# Patient Record
Sex: MALE | Employment: FULL TIME | URBAN - METROPOLITAN AREA
[De-identification: names, ages, dates, MRNs, and addresses within clinical notes are randomized per-mention and may not be internally consistent; named-entity substitution may affect disease eponyms.]

---

## 2020-08-06 ENCOUNTER — OP HISTORICAL/CONVERTED ENCOUNTER (OUTPATIENT)
Dept: OTHER | Age: 60
End: 2020-08-06

## 2020-09-01 ENCOUNTER — OP HISTORICAL/CONVERTED ENCOUNTER (OUTPATIENT)
Dept: OTHER | Age: 60
End: 2020-09-01

## 2020-09-16 ENCOUNTER — HOSPITAL ENCOUNTER (OUTPATIENT)
Dept: CARDIAC REHAB | Age: 60
Discharge: HOME | End: 2020-09-16
Payer: COMMERCIAL

## 2020-09-16 VITALS — WEIGHT: 192 LBS

## 2020-09-16 PROCEDURE — 93798 PHYS/QHP OP CAR RHAB W/ECG: CPT

## 2020-09-17 ENCOUNTER — APPOINTMENT (OUTPATIENT)
Dept: CARDIAC REHAB | Age: 60
End: 2020-09-17
Payer: COMMERCIAL

## 2020-09-17 ENCOUNTER — HOSPITAL ENCOUNTER (OUTPATIENT)
Dept: CARDIAC REHAB | Age: 60
Discharge: HOME | End: 2020-09-17
Payer: COMMERCIAL

## 2020-09-17 VITALS — WEIGHT: 192 LBS

## 2020-09-17 PROCEDURE — 93798 PHYS/QHP OP CAR RHAB W/ECG: CPT

## 2020-09-21 ENCOUNTER — HOSPITAL ENCOUNTER (OUTPATIENT)
Dept: CARDIAC REHAB | Age: 60
Discharge: HOME | End: 2020-09-21
Payer: COMMERCIAL

## 2020-09-21 VITALS — WEIGHT: 194 LBS

## 2020-09-21 PROCEDURE — 93797 PHYS/QHP OP CAR RHAB WO ECG: CPT

## 2020-09-21 PROCEDURE — 93798 PHYS/QHP OP CAR RHAB W/ECG: CPT

## 2020-09-23 ENCOUNTER — APPOINTMENT (OUTPATIENT)
Dept: CARDIAC REHAB | Age: 60
End: 2020-09-23
Payer: COMMERCIAL

## 2020-09-24 ENCOUNTER — HOSPITAL ENCOUNTER (OUTPATIENT)
Dept: CARDIAC REHAB | Age: 60
Discharge: HOME | End: 2020-09-24
Payer: COMMERCIAL

## 2020-09-24 VITALS — WEIGHT: 195 LBS

## 2020-09-24 PROCEDURE — 93798 PHYS/QHP OP CAR RHAB W/ECG: CPT

## 2020-09-28 ENCOUNTER — HOSPITAL ENCOUNTER (OUTPATIENT)
Dept: CARDIAC REHAB | Age: 60
Discharge: HOME | End: 2020-09-28
Payer: COMMERCIAL

## 2020-09-28 VITALS — WEIGHT: 195 LBS

## 2020-09-28 PROCEDURE — 93797 PHYS/QHP OP CAR RHAB WO ECG: CPT

## 2020-09-28 PROCEDURE — 93798 PHYS/QHP OP CAR RHAB W/ECG: CPT

## 2020-09-30 ENCOUNTER — HOSPITAL ENCOUNTER (OUTPATIENT)
Dept: CARDIAC REHAB | Age: 60
Discharge: HOME | End: 2020-09-30
Payer: COMMERCIAL

## 2020-09-30 VITALS — WEIGHT: 196 LBS

## 2020-09-30 PROCEDURE — 93798 PHYS/QHP OP CAR RHAB W/ECG: CPT

## 2020-10-01 ENCOUNTER — APPOINTMENT (OUTPATIENT)
Dept: CARDIAC REHAB | Age: 60
End: 2020-10-01
Payer: COMMERCIAL

## 2020-10-01 ENCOUNTER — HOSPITAL ENCOUNTER (OUTPATIENT)
Dept: CARDIAC REHAB | Age: 60
Discharge: HOME | End: 2020-10-01
Payer: COMMERCIAL

## 2020-10-01 ENCOUNTER — OP HISTORICAL/CONVERTED ENCOUNTER (OUTPATIENT)
Dept: OTHER | Age: 60
End: 2020-10-01

## 2020-10-01 VITALS — WEIGHT: 195 LBS

## 2020-10-01 PROCEDURE — 93798 PHYS/QHP OP CAR RHAB W/ECG: CPT

## 2020-10-05 ENCOUNTER — HOSPITAL ENCOUNTER (OUTPATIENT)
Dept: CARDIAC REHAB | Age: 60
Discharge: HOME | End: 2020-10-05
Payer: COMMERCIAL

## 2020-10-05 ENCOUNTER — APPOINTMENT (OUTPATIENT)
Dept: CARDIAC REHAB | Age: 60
End: 2020-10-05
Payer: COMMERCIAL

## 2020-10-05 VITALS — WEIGHT: 195 LBS

## 2020-10-05 PROCEDURE — 93798 PHYS/QHP OP CAR RHAB W/ECG: CPT

## 2020-10-07 ENCOUNTER — HOSPITAL ENCOUNTER (OUTPATIENT)
Dept: CARDIAC REHAB | Age: 60
Discharge: HOME | End: 2020-10-07
Payer: COMMERCIAL

## 2020-10-07 VITALS — WEIGHT: 197 LBS

## 2020-10-07 PROCEDURE — 93798 PHYS/QHP OP CAR RHAB W/ECG: CPT

## 2020-10-08 ENCOUNTER — APPOINTMENT (OUTPATIENT)
Dept: CARDIAC REHAB | Age: 60
End: 2020-10-08
Payer: COMMERCIAL

## 2020-10-08 ENCOUNTER — HOSPITAL ENCOUNTER (OUTPATIENT)
Dept: CARDIAC REHAB | Age: 60
Discharge: HOME | End: 2020-10-08
Payer: COMMERCIAL

## 2020-10-08 VITALS — WEIGHT: 196 LBS

## 2020-10-08 PROCEDURE — 93798 PHYS/QHP OP CAR RHAB W/ECG: CPT

## 2020-10-12 ENCOUNTER — HOSPITAL ENCOUNTER (OUTPATIENT)
Dept: CARDIAC REHAB | Age: 60
Discharge: HOME | End: 2020-10-12
Payer: COMMERCIAL

## 2020-10-12 VITALS — WEIGHT: 196 LBS

## 2020-10-12 PROCEDURE — 93798 PHYS/QHP OP CAR RHAB W/ECG: CPT

## 2020-10-14 ENCOUNTER — HOSPITAL ENCOUNTER (OUTPATIENT)
Dept: CARDIAC REHAB | Age: 60
Discharge: HOME | End: 2020-10-14
Payer: COMMERCIAL

## 2020-10-14 VITALS — WEIGHT: 197 LBS

## 2020-10-14 PROCEDURE — 93798 PHYS/QHP OP CAR RHAB W/ECG: CPT

## 2020-10-15 ENCOUNTER — HOSPITAL ENCOUNTER (OUTPATIENT)
Dept: CARDIAC REHAB | Age: 60
Discharge: HOME | End: 2020-10-15
Payer: COMMERCIAL

## 2020-10-15 VITALS — WEIGHT: 196 LBS

## 2020-10-15 PROCEDURE — 93797 PHYS/QHP OP CAR RHAB WO ECG: CPT

## 2020-10-15 PROCEDURE — 93798 PHYS/QHP OP CAR RHAB W/ECG: CPT

## 2020-10-19 ENCOUNTER — APPOINTMENT (OUTPATIENT)
Dept: CARDIAC REHAB | Age: 60
End: 2020-10-19
Payer: COMMERCIAL

## 2020-10-21 ENCOUNTER — HOSPITAL ENCOUNTER (OUTPATIENT)
Dept: CARDIAC REHAB | Age: 60
Discharge: HOME | End: 2020-10-21
Payer: COMMERCIAL

## 2020-10-21 VITALS — WEIGHT: 199 LBS

## 2020-10-21 PROCEDURE — 93798 PHYS/QHP OP CAR RHAB W/ECG: CPT

## 2020-10-22 ENCOUNTER — HOSPITAL ENCOUNTER (OUTPATIENT)
Dept: CARDIAC REHAB | Age: 60
Discharge: HOME | End: 2020-10-22
Payer: COMMERCIAL

## 2020-10-22 VITALS — WEIGHT: 197 LBS

## 2020-10-22 PROCEDURE — 93798 PHYS/QHP OP CAR RHAB W/ECG: CPT

## 2021-07-17 ENCOUNTER — APPOINTMENT (OUTPATIENT)
Dept: CT IMAGING | Age: 61
End: 2021-07-17
Attending: EMERGENCY MEDICINE
Payer: COMMERCIAL

## 2021-07-17 ENCOUNTER — HOSPITAL ENCOUNTER (EMERGENCY)
Age: 61
Discharge: ANOTHER HOSPITAL | End: 2021-07-17
Attending: EMERGENCY MEDICINE | Admitting: SURGERY
Payer: COMMERCIAL

## 2021-07-17 ENCOUNTER — APPOINTMENT (OUTPATIENT)
Dept: GENERAL RADIOLOGY | Age: 61
End: 2021-07-17
Attending: EMERGENCY MEDICINE
Payer: COMMERCIAL

## 2021-07-17 VITALS
HEIGHT: 70 IN | HEART RATE: 67 BPM | BODY MASS INDEX: 28.49 KG/M2 | TEMPERATURE: 98.3 F | SYSTOLIC BLOOD PRESSURE: 156 MMHG | RESPIRATION RATE: 22 BRPM | OXYGEN SATURATION: 98 % | DIASTOLIC BLOOD PRESSURE: 76 MMHG | WEIGHT: 199 LBS

## 2021-07-17 DIAGNOSIS — S22.41XA MULTIPLE FRACTURES OF RIBS, RIGHT SIDE, INITIAL ENCOUNTER FOR CLOSED FRACTURE: Primary | ICD-10-CM

## 2021-07-17 DIAGNOSIS — J94.2 HEMOPNEUMOTHORAX ON RIGHT: ICD-10-CM

## 2021-07-17 DIAGNOSIS — I71.019 DISSECTION OF THORACIC AORTA: ICD-10-CM

## 2021-07-17 PROBLEM — S22.39XA RIB FRACTURE: Status: ACTIVE | Noted: 2021-07-17

## 2021-07-17 LAB
ALBUMIN SERPL-MCNC: 3.8 G/DL (ref 3.5–5)
ALBUMIN/GLOB SERPL: 1 {RATIO} (ref 1.1–2.2)
ALP SERPL-CCNC: 53 U/L (ref 45–117)
ALT SERPL-CCNC: 33 U/L (ref 12–78)
ANION GAP SERPL CALC-SCNC: 5 MMOL/L (ref 5–15)
AST SERPL W P-5'-P-CCNC: 40 U/L (ref 15–37)
BILIRUB SERPL-MCNC: 0.6 MG/DL (ref 0.2–1)
BUN SERPL-MCNC: 16 MG/DL (ref 6–20)
BUN/CREAT SERPL: 12 (ref 12–20)
CA-I BLD-MCNC: 8.6 MG/DL (ref 8.5–10.1)
CHLORIDE SERPL-SCNC: 108 MMOL/L (ref 97–108)
CO2 SERPL-SCNC: 26 MMOL/L (ref 21–32)
CREAT SERPL-MCNC: 1.37 MG/DL (ref 0.7–1.3)
ERYTHROCYTE [DISTWIDTH] IN BLOOD BY AUTOMATED COUNT: 13.5 % (ref 11.5–14.5)
GLOBULIN SER CALC-MCNC: 3.9 G/DL (ref 2–4)
GLUCOSE SERPL-MCNC: 160 MG/DL (ref 65–100)
HCT VFR BLD AUTO: 48.6 % (ref 36.6–50.3)
HGB BLD-MCNC: 15.9 G/DL (ref 12.1–17)
INR PPP: 1.5 (ref 0.9–1.1)
LACTATE SERPL-SCNC: 1.9 MMOL/L (ref 0.4–2)
LIPASE SERPL-CCNC: 144 U/L (ref 73–393)
MCH RBC QN AUTO: 29.2 PG (ref 26–34)
MCHC RBC AUTO-ENTMCNC: 32.7 G/DL (ref 30–36.5)
MCV RBC AUTO: 89.2 FL (ref 80–99)
NRBC # BLD: 0 K/UL (ref 0–0.01)
NRBC BLD-RTO: 0 PER 100 WBC
PLATELET # BLD AUTO: 258 K/UL (ref 150–400)
PMV BLD AUTO: 10.4 FL (ref 8.9–12.9)
POTASSIUM SERPL-SCNC: 4 MMOL/L (ref 3.5–5.1)
PROT SERPL-MCNC: 7.7 G/DL (ref 6.4–8.2)
PROTHROMBIN TIME: 17.5 SEC (ref 11.9–14.7)
RBC # BLD AUTO: 5.45 M/UL (ref 4.1–5.7)
SODIUM SERPL-SCNC: 139 MMOL/L (ref 136–145)
TROPONIN I SERPL-MCNC: <0.05 NG/ML
WBC # BLD AUTO: 5.2 K/UL (ref 4.1–11.1)

## 2021-07-17 PROCEDURE — 80053 COMPREHEN METABOLIC PANEL: CPT

## 2021-07-17 PROCEDURE — 96374 THER/PROPH/DIAG INJ IV PUSH: CPT

## 2021-07-17 PROCEDURE — 74177 CT ABD & PELVIS W/CONTRAST: CPT

## 2021-07-17 PROCEDURE — 85027 COMPLETE CBC AUTOMATED: CPT

## 2021-07-17 PROCEDURE — 83690 ASSAY OF LIPASE: CPT

## 2021-07-17 PROCEDURE — 85610 PROTHROMBIN TIME: CPT

## 2021-07-17 PROCEDURE — 36415 COLL VENOUS BLD VENIPUNCTURE: CPT

## 2021-07-17 PROCEDURE — 99285 EMERGENCY DEPT VISIT HI MDM: CPT

## 2021-07-17 PROCEDURE — 84484 ASSAY OF TROPONIN QUANT: CPT

## 2021-07-17 PROCEDURE — 93005 ELECTROCARDIOGRAM TRACING: CPT

## 2021-07-17 PROCEDURE — 75810000467 HC TRAUMA RESPONSE LVL III PARITIAL ACTIVATION

## 2021-07-17 PROCEDURE — 71045 X-RAY EXAM CHEST 1 VIEW: CPT

## 2021-07-17 PROCEDURE — 71275 CT ANGIOGRAPHY CHEST: CPT

## 2021-07-17 PROCEDURE — 70450 CT HEAD/BRAIN W/O DYE: CPT

## 2021-07-17 PROCEDURE — 74011000636 HC RX REV CODE- 636: Performed by: EMERGENCY MEDICINE

## 2021-07-17 PROCEDURE — 74011250636 HC RX REV CODE- 250/636: Performed by: EMERGENCY MEDICINE

## 2021-07-17 PROCEDURE — 96375 TX/PRO/DX INJ NEW DRUG ADDON: CPT

## 2021-07-17 PROCEDURE — 83605 ASSAY OF LACTIC ACID: CPT

## 2021-07-17 RX ORDER — MORPHINE SULFATE 4 MG/ML
4 INJECTION INTRAVENOUS ONCE
Status: COMPLETED | OUTPATIENT
Start: 2021-07-17 | End: 2021-07-17

## 2021-07-17 RX ORDER — ONDANSETRON 2 MG/ML
4 INJECTION INTRAMUSCULAR; INTRAVENOUS
Status: COMPLETED | OUTPATIENT
Start: 2021-07-17 | End: 2021-07-17

## 2021-07-17 RX ORDER — OXYCODONE HYDROCHLORIDE 5 MG/1
5 TABLET ORAL
Status: DISCONTINUED | OUTPATIENT
Start: 2021-07-17 | End: 2021-07-17 | Stop reason: HOSPADM

## 2021-07-17 RX ORDER — LIDOCAINE 4 G/100G
1 PATCH TOPICAL EVERY 24 HOURS
Status: DISCONTINUED | OUTPATIENT
Start: 2021-07-17 | End: 2021-07-17 | Stop reason: HOSPADM

## 2021-07-17 RX ORDER — ACETAMINOPHEN 325 MG/1
650 TABLET ORAL 4 TIMES DAILY
Status: DISCONTINUED | OUTPATIENT
Start: 2021-07-17 | End: 2021-07-17 | Stop reason: HOSPADM

## 2021-07-17 RX ADMIN — SODIUM CHLORIDE 1000 ML: 9 INJECTION, SOLUTION INTRAVENOUS at 12:53

## 2021-07-17 RX ADMIN — MORPHINE SULFATE 4 MG: 4 INJECTION, SOLUTION INTRAMUSCULAR; INTRAVENOUS at 12:54

## 2021-07-17 RX ADMIN — ONDANSETRON 4 MG: 2 INJECTION INTRAMUSCULAR; INTRAVENOUS at 12:54

## 2021-07-17 RX ADMIN — IOPAMIDOL 100 ML: 755 INJECTION, SOLUTION INTRAVENOUS at 12:02

## 2021-07-17 NOTE — PROGRESS NOTES
Visit attempted for patient in the ED for [facility]. Staff were providing care for the patient during the visit. There were no family members. Provided silent support and prayer. Chaplains will follow up if further referrals are requested. Chaplain Dani Gama M.Div.    can be reached by calling the  at [facility]  [phone]

## 2021-07-17 NOTE — ED PROVIDER NOTES
HPI   Chief Complaint   Patient presents with   [facility] hx open heart surgery for thoracic aortic dissection and valve repair on warfarin presents by EMS as beta trauma. 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 Hit left forehead and right chest, right upper quadrant abdomen. Mild forehead pain, moderate constant dull right side pain over the chest and abdomen. Mildly nauseated. No neck pain or back pain. Was able to walk a couple of steps on scene. No LOC. History reviewed. No pertinent past medical history. Past Surgical History:   Procedure Laterality Date    SD CHEST SURGERY PROCEDURE UNLISTED      thoracic aortic dissection and valve repair at [facility]         History reviewed. No pertinent family history. Social History     Socioeconomic History    Marital status:      Spouse name: Not on file    Number of children: Not on file    Years of education: Not on file    Highest education level: Not on file   Occupational History    Not on file   Tobacco Use    Smoking status: Not on file   Substance and Sexual Activity    Alcohol use: Not Currently    Drug use: Not on file    Sexual activity: Not on file   Other Topics Concern    Not on file   Social History Narrative    Not on file     Social Determinants of Health     Financial Resource Strain:     Difficulty of Paying Living Expenses:    Food Insecurity:     Worried About Running Out of Food in the Last Year:     [address] in the Last Year:    Transportation Needs:     Lack of Transportation (Medical):      Lack of Transportation (Non-Medical):    Physical Activity:     Days of Exercise per Week:     Minutes of Exercise per Session:    Stress:     Feeling of Stress :    Social Connections:     Frequency of Communication with Friends and Family:     Frequency of Social Gatherings with Friends and Family:     Attends Congregational Services:     Active Member of Clubs or Organizations:     Attends Club or Organization Meetings:     Marital Status:    Intimate Partner Violence:     Fear of Current or Ex-Partner:     Emotionally Abused:     Physically Abused:     Sexually Abused: ALLERGIES: Patient has no known allergies. Review of Systems   Cardiovascular: Positive for chest pain. Gastrointestinal: Positive for abdominal pain and nausea. Neurological: Positive for headaches. All other systems reviewed and are negative. Vitals:    07/17/21 1230 07/17/21 1300 07/17/21 1330 07/17/21 1400   BP: (!) 153/88 (!) 149/92 (!) 141/86 (!) 156/76   Pulse: (!) 59 (!) 59 60 67   Resp: 23 21 24 22   Temp:       SpO2: 96% 98% 97% 98%   Weight:       Height:                Physical Exam   Patient Vitals for the past 8 hrs:   Temp Pulse Resp BP SpO2   07/17/21 1400  67 22 (!) 156/76 98 %   07/17/21 1330  60 24 (!) 141/86 97 %   07/17/21 1300  (!) 59 21 (!) 149/92 98 %   07/17/21 1230  (!) 59 23 (!) 153/88 96 %   07/17/21 1200  60 21 (!) 158/85 97 %   07/17/21 1137 98.3 °F (36.8 °C) 60 17 131/84 96 %        Nursing note and vitals reviewed. Airway and Mental Status: Breathing spontaneously, responsive to voice and touch  Breathing: Bilateral breath sounds, equal chest rise and fall. Circulation: No active external hemorrhage. 2+ bilateral femoral pulses, 2+ bilateral radial pulses. Good skin color. General: No external signs of penetrating trauma or external hemorrage. Neuro: Moves all extremities on command  HEENT: No obvious fractures or deformities. Left forehead abrasion. Pupils equal and reactive. Trachea midline. Chest: No visible deformities. Equal chest rise and fall. Right chest abrasion and tenderness. No crepitus or sternal tenderness. Negative pericardial window. Abdomen: Nondistended, soft, RUQ tender with abrasion. FAST negative  Pelvis: No pelvic instability. Back: No cervical, thoracic or lumbar stepoffs or tenderness.  No ecchymosis or signs of penetrating trauma. Rectal/Perineum/: No signs of perineal trauma. Extremity: No lacerations. No obvious fractures or deformities. Compartments soft in bilateral upper and lower extremities. No visible axillary injuries. MDM   Ddx = head injury, right chest injury (rib fx, pulmonary contusion), right abdominal injury (liver/kidney laceration, contusion). Lower suspicion for spinal injury and all extremity injuries. EKG was obtained for trauma. My preliminary interpretation at 1138 showing normal sinus rhythm, rate 61, anterolateral T wave inversions. No previous EKG for comparison. On re-assessment pt having good pain relief from morphine. Workup reveals two rib fx on the right. On my review of the CT images pt does have tiny hemopneumothorax and thoracic aortic dissection. Pt needs admission for the bleeding rib fx, as he is high risk for further bleeding due to being on warfarin. I consulted Dr Rowena Canas surgery who reviewed his CT imagines with me and he recommends transfer to [facility] for the dissection and rib fx, as he is not comfortable admitting with a visible dissecting flap in the aorta. Pt has previous aorta repair at [facility] with Dr. Louis Brown. I called Dr. Marcelle Gaming surgeon at [address] [place] [address] who says the dissection in the transverse and descending thoracic aorta is a normal postop change as only the ascending aorta is repaired for dissection. He says no need for cardiothoracic intervention. I also called Dr. Rene Olivia with [facility] ED who accepted the patient for transfer. ICD-10-CM ICD-9-CM    1. Multiple fractures of ribs, right side, initial encounter for closed fracture  S22.41XA 807.09    2. Hemopneumothorax on right  J94.2 511.89    3.  Dissection of thoracic aorta (HCC)  I71.01 441.01        Labs Reviewed   METABOLIC PANEL, COMPREHENSIVE - Abnormal; Notable for the following components:       Result Value    Glucose 160 (*)     Creatinine 1.37 (*)     GFR est non-AA 53 (*)     AST (SGOT) 40 (*)     A-G Ratio 1.0 (*)     All other components within normal limits   PROTHROMBIN TIME + INR - Abnormal; Notable for the following components:    Prothrombin time 17.5 (*)     INR 1.5 (*)     All other components within normal limits   CBC W/O DIFF   LIPASE   LACTIC ACID   TROPONIN I   URINALYSIS W/MICROSCOPIC     CT HEAD WO CONT   Final Result   No acute intracranial abnormality. CTA CHEST W OR W WO CONT   Final Result   Acute right rib fractures as above. Age-indeterminate thoracic aortic dissection as above. CT ABD PELV W CONT   Final Result   Small anterior right basilar pneumothorax associated with previously   described right rib fractures. Chronic abdominal and pelvic findings as above. XR CHEST PORT   Final Result   Findings/IMPRESSION:      The cardiac silhouette is enlarged although this is at least in part due to   accentuation by portable AP technique. Post median sternotomy changes. Lung volumes are maintained. No focal consolidation, large pleural effusion, or   discernible pneumothorax. Displaced fractures of the right fifth and sixth ribs and possible fractures of   the right seventh and eighth ribs. Medications   lidocaine 4 % patch 1 Patch (has no administration in time range)   oxyCODONE IR (ROXICODONE) tablet 5 mg (has no administration in time range)   acetaminophen (TYLENOL) tablet 650 mg (has no administration in time range)   sodium chloride 0.9 % bolus infusion 1,000 mL (1,000 mL IntraVENous New Bag 7/17/21 1253)   morphine injection 4 mg (4 mg IntraVENous Given 7/17/21 1254)   ondansetron (ZOFRAN) injection 4 mg (4 mg IntraVENous Given 7/17/21 1254)   iopamidoL (ISOVUE-370) 76 % injection 100 mL (100 mL IntraVENous Given 7/17/21 1202)     Aubree MEDINA MD, am  the first and primary ED provider for this patient. Critical Care  Performed by: Milad Whitney MD  Authorized by:  Milad Whitney MD     Critical care provider statement:     Critical care time (minutes):  45    Critical care time was exclusive of:  Separately billable procedures and treating other patients and teaching time    Critical care was necessary to treat or prevent imminent or life-threatening deterioration of the following conditions:  Trauma    Critical care was time spent personally by me on the following activities:  Blood draw for specimens, development of treatment plan with patient or surrogate, discussions with consultants, evaluation of patient's response to treatment, examination of patient, obtaining history from patient or surrogate, ordering and performing treatments and interventions, ordering and review of laboratory studies, ordering and review of radiographic studies, pulse oximetry and re-evaluation of patient's condition

## 2021-07-17 NOTE — ED NOTES
Report called to Darnell Hernandez RN at [facility]; pt left via [facility]; all personal belongings taken by wife

## 2021-07-17 NOTE — ED TRIAGE NOTES
FALL FROM TREE APPROX 20 FEET, ONTO RIGHT SIDE, NO HEAD OR NECK INJURY, NO LOC,  PT NOT IN C COLLAR ON ER ARRIVAL BUT REFUSED C COLLAR AND STILL REFUSING, PT REQUESTED TO WALK IN, GCS 15,  ANO X 4

## 2021-07-17 NOTE — Clinical Note
Status[de-identified] INPATIENT [101]   Type of Bed: Surgical [18]   Inpatient Hospitalization Certified Necessary for the Following Reasons: 3.  Patient receiving treatment that can only be provided in an inpatient setting (further clarification in H&P documentation)   Admitting Diagnosis: Rib fracture [186780]   Admitting Physician: Masood Acosta [[id number]]   Attending Physician: Masood Acosta [[id number]]   Estimated Length of Stay: 2 Midnights   Discharge Plan[de-identified] Home with Office Follow-up

## 2021-07-19 LAB
ATRIAL RATE: 61 BPM
CALCULATED P AXIS, ECG09: 51 DEGREES
CALCULATED R AXIS, ECG10: 53 DEGREES
CALCULATED T AXIS, ECG11: 94 DEGREES
DIAGNOSIS, 93000: NORMAL
P-R INTERVAL, ECG05: 174 MS
Q-T INTERVAL, ECG07: 426 MS
QRS DURATION, ECG06: 98 MS
QTC CALCULATION (BEZET), ECG08: 428 MS
VENTRICULAR RATE, ECG03: 61 BPM

## 2022-03-19 PROBLEM — S22.39XA RIB FRACTURE: Status: ACTIVE | Noted: 2021-07-17

## 2024-07-03 ENCOUNTER — APPOINTMENT (OUTPATIENT)
Facility: HOSPITAL | Age: 64
End: 2024-07-03
Payer: COMMERCIAL

## 2024-07-03 ENCOUNTER — HOSPITAL ENCOUNTER (EMERGENCY)
Facility: HOSPITAL | Age: 64
Discharge: HOME | End: 2024-07-03
Attending: EMERGENCY MEDICINE
Payer: COMMERCIAL

## 2024-07-03 VITALS
TEMPERATURE: 97.9 F | DIASTOLIC BLOOD PRESSURE: 96 MMHG | BODY MASS INDEX: 27.63 KG/M2 | OXYGEN SATURATION: 98 % | HEART RATE: 60 BPM | WEIGHT: 193 LBS | SYSTOLIC BLOOD PRESSURE: 142 MMHG | RESPIRATION RATE: 18 BRPM | HEIGHT: 70 IN

## 2024-07-03 DIAGNOSIS — R41.0 TRANSIENT CONFUSION: Primary | ICD-10-CM

## 2024-07-03 LAB
ALBUMIN SERPL-MCNC: 3.5 G/DL (ref 3.5–5)
ALBUMIN/GLOB SERPL: 1 (ref 1.1–2.2)
ALP SERPL-CCNC: 61 U/L (ref 45–117)
ALT SERPL-CCNC: 40 U/L (ref 12–78)
ANION GAP SERPL CALC-SCNC: 7 MMOL/L (ref 5–15)
AST SERPL W P-5'-P-CCNC: 38 U/L (ref 15–37)
BASOPHILS # BLD: 0 K/UL (ref 0–0.1)
BASOPHILS NFR BLD: 0 % (ref 0–1)
BILIRUB SERPL-MCNC: 0.5 MG/DL (ref 0.2–1)
BUN SERPL-MCNC: 10 MG/DL (ref 6–20)
BUN/CREAT SERPL: 9 (ref 12–20)
CA-I BLD-MCNC: 8.5 MG/DL (ref 8.5–10.1)
CHLORIDE SERPL-SCNC: 103 MMOL/L (ref 97–108)
CO2 SERPL-SCNC: 29 MMOL/L (ref 21–32)
CREAT SERPL-MCNC: 1.1 MG/DL (ref 0.7–1.3)
DIFFERENTIAL METHOD BLD: ABNORMAL
EOSINOPHIL # BLD: 0.2 K/UL (ref 0–0.4)
EOSINOPHIL NFR BLD: 3 % (ref 0–7)
ERYTHROCYTE [DISTWIDTH] IN BLOOD BY AUTOMATED COUNT: 13.8 % (ref 11.5–14.5)
GLOBULIN SER CALC-MCNC: 3.6 G/DL (ref 2–4)
GLUCOSE SERPL-MCNC: 99 MG/DL (ref 65–100)
HCT VFR BLD AUTO: 44.8 % (ref 36.6–50.3)
HGB BLD-MCNC: 14.9 G/DL (ref 12.1–17)
IMM GRANULOCYTES # BLD AUTO: 0 K/UL (ref 0–0.04)
IMM GRANULOCYTES NFR BLD AUTO: 0 % (ref 0–0.5)
INR PPP: 2.6 (ref 0.9–1.1)
LYMPHOCYTES # BLD: 2.4 K/UL (ref 0.8–3.5)
LYMPHOCYTES NFR BLD: 51 % (ref 12–49)
MCH RBC QN AUTO: 29.7 PG (ref 26–34)
MCHC RBC AUTO-ENTMCNC: 33.3 G/DL (ref 30–36.5)
MCV RBC AUTO: 89.2 FL (ref 80–99)
MONOCYTES # BLD: 0.8 K/UL (ref 0–1)
MONOCYTES NFR BLD: 15 % (ref 5–13)
NEUTS SEG # BLD: 1.5 K/UL (ref 1.8–8)
NEUTS SEG NFR BLD: 31 % (ref 32–75)
PLATELET # BLD AUTO: 227 K/UL (ref 150–400)
PMV BLD AUTO: 9.7 FL (ref 8.9–12.9)
POTASSIUM SERPL-SCNC: 4.2 MMOL/L (ref 3.5–5.1)
PROT SERPL-MCNC: 7.1 G/DL (ref 6.4–8.2)
PROTHROMBIN TIME: 24.5 SEC (ref 9–11.1)
RBC # BLD AUTO: 5.02 M/UL (ref 4.1–5.7)
SODIUM SERPL-SCNC: 139 MMOL/L (ref 136–145)
WBC # BLD AUTO: 4.9 K/UL (ref 4.1–11.1)

## 2024-07-03 PROCEDURE — 80053 COMPREHEN METABOLIC PANEL: CPT

## 2024-07-03 PROCEDURE — 36415 COLL VENOUS BLD VENIPUNCTURE: CPT

## 2024-07-03 PROCEDURE — 85025 COMPLETE CBC W/AUTO DIFF WBC: CPT

## 2024-07-03 PROCEDURE — 70450 CT HEAD/BRAIN W/O DYE: CPT

## 2024-07-03 PROCEDURE — 99284 EMERGENCY DEPT VISIT MOD MDM: CPT

## 2024-07-03 PROCEDURE — 85610 PROTHROMBIN TIME: CPT

## 2024-07-03 ASSESSMENT — LIFESTYLE VARIABLES
HOW MANY STANDARD DRINKS CONTAINING ALCOHOL DO YOU HAVE ON A TYPICAL DAY: PATIENT DOES NOT DRINK
HOW OFTEN DO YOU HAVE A DRINK CONTAINING ALCOHOL: NEVER

## 2024-07-03 ASSESSMENT — PAIN SCALES - GENERAL
PAINLEVEL_OUTOF10: 0
PAINLEVEL_OUTOF10: 0

## 2024-07-03 ASSESSMENT — PAIN - FUNCTIONAL ASSESSMENT
PAIN_FUNCTIONAL_ASSESSMENT: 0-10
PAIN_FUNCTIONAL_ASSESSMENT: 0-10

## 2024-07-04 NOTE — ED PROVIDER NOTES
[facility] EMERGENCY DEPT  EMERGENCY DEPARTMENT HISTORY AND PHYSICAL EXAM      Date: 7/3/2024  Patient Name: Manuel Combs Jr.  MRN: [id number]  YOB: 1960  Date of evaluation: 7/3/2024  Provider: Nena Gardner MD   Note Started: 10:38 PM EDT 7/3/24    HISTORY OF PRESENT ILLNESS     Chief Complaint   Patient presents with    \"feels off\"       History Provided By: Patient    HPI: Manuel Combs Jr. is a 63-year-old male with history of aortic dissection status post AVR on Coumadin, hypertension presenting with 5 to 10-minute episode where he felt off.  Patient was talking to his parents and he felt like he could not concentrate on the conversation and focus.  No numbness, weakness, vision changes.  patient now back to baseline.  Patient states he checked his INR on Friday and was little bit elevated.    PAST MEDICAL HISTORY   Past Medical History:  Past Medical History:   Diagnosis Date    Hypertension        Past Surgical History:  Past Surgical History:   Procedure Laterality Date    AORTIC VALVE REPLACEMENT      CHEST SURGERY      thoracic aortic dissection and valve repair at [facility]       Family History:  History reviewed. No pertinent family history.    Social History:  Social History     Tobacco Use    Smoking status: Never    Smokeless tobacco: Never   Substance Use Topics    Alcohol use: Not Currently    Drug use: Not Currently       Allergies:  No Known Allergies    PCP: Vladislav Irizarry Jr., APRN - NP    Current Meds:   No current facility-administered medications for this encounter.     No current outpatient medications on file.       Social Determinants of Health:   Social Determinants of Health     Tobacco Use: Low Risk  (7/3/2024)    Patient History     Smoking Tobacco Use: Never     Smokeless Tobacco Use: Never     Passive Exposure: Not on file   Alcohol Use: Not At Risk (7/3/2024)    AUDIT-C     Frequency of Alcohol Consumption: Never     Average Number of Drinks: Patient does not

## 2024-07-04 NOTE — DISCHARGE INSTRUCTIONS
Thank you for choosing our Emergency Department for your care.  It is our privilege to care for you in your time of need.  In the next several days, you may receive a survey via email or mailed to your home about your experience with our team.  We would greatly appreciate you taking a few minutes to complete the survey, as we use this information to learn what we have done well and what we could be doing better. Thank you for trusting us with your care!    Below you will find a list of your tests from today's visit.   Labs  Recent Results (from the past 12 hour(s))   CBC with Auto Differential    Collection Time: 07/03/24 10:30 PM   Result Value Ref Range    WBC 4.9 4.1 - 11.1 K/uL    RBC 5.02 4.10 - 5.70 M/uL    Hemoglobin 14.9 12.1 - 17.0 g/dL    Hematocrit 44.8 36.6 - 50.3 %    MCV 89.2 80.0 - 99.0 FL    MCH 29.7 26.0 - 34.0 PG    MCHC 33.3 30.0 - 36.5 g/dL    RDW 13.8 11.5 - 14.5 %    Platelets 227 150 - 400 K/uL    MPV 9.7 8.9 - 12.9 FL    Neutrophils % 31 (L) 32 - 75 %    Lymphocytes % 51 (H) 12 - 49 %    Monocytes % 15 (H) 5 - 13 %    Eosinophils % 3 0 - 7 %    Basophils % 0 0 - 1 %    Immature Granulocytes % 0 0.0 - 0.5 %    Neutrophils Absolute 1.5 (L) 1.8 - 8.0 K/UL    Lymphocytes Absolute 2.4 0.8 - 3.5 K/UL    Monocytes Absolute 0.8 0.0 - 1.0 K/UL    Eosinophils Absolute 0.2 0.0 - 0.4 K/UL    Basophils Absolute 0.0 0.0 - 0.1 K/UL    Immature Granulocytes Absolute 0.0 0.00 - 0.04 K/UL    Differential Type AUTOMATED     Comprehensive Metabolic Panel    Collection Time: 07/03/24 10:30 PM   Result Value Ref Range    Sodium 139 136 - 145 mmol/L    Potassium 4.2 3.5 - 5.1 mmol/L    Chloride 103 97 - 108 mmol/L    CO2 29 21 - 32 mmol/L    Anion Gap 7 5 - 15 mmol/L    Glucose 99 65 - 100 mg/dL    BUN 10 6 - 20 mg/dL    Creatinine 1.10 0.70 - 1.30 mg/dL    BUN/Creatinine Ratio 9 (L) 12 - 20      Est, Glom Filt Rate 76 >60 ml/min/1.73m2    Calcium 8.5 8.5 - 10.1 mg/dL    Total Bilirubin 0.5 0.2 - 1.0 mg/dL

## 2024-07-04 NOTE — ED TRIAGE NOTES
Pt states around 1845 this evening he noticed that he was feeling off while he was having a conversation with someone. States he was having trouble tracking what they were saying.

## 2024-09-20 ENCOUNTER — APPOINTMENT (OUTPATIENT)
Facility: HOSPITAL | Age: 64
DRG: 872 | End: 2024-09-20
Payer: COMMERCIAL

## 2024-09-20 ENCOUNTER — HOSPITAL ENCOUNTER (EMERGENCY)
Facility: HOSPITAL | Age: 64
Discharge: HOME | DRG: 872 | End: 2024-09-20
Payer: COMMERCIAL

## 2024-09-20 ENCOUNTER — HOSPITAL ENCOUNTER (INPATIENT)
Facility: HOSPITAL | Age: 64
LOS: 5 days | Discharge: HOME HEALTH | DRG: 872 | End: 2024-09-26
Attending: STUDENT IN AN ORGANIZED HEALTH CARE EDUCATION/TRAINING PROGRAM | Admitting: INTERNAL MEDICINE
Payer: COMMERCIAL

## 2024-09-20 VITALS
SYSTOLIC BLOOD PRESSURE: 144 MMHG | WEIGHT: 200 LBS | HEIGHT: 70 IN | DIASTOLIC BLOOD PRESSURE: 80 MMHG | BODY MASS INDEX: 28.63 KG/M2 | OXYGEN SATURATION: 98 % | RESPIRATION RATE: 17 BRPM | HEART RATE: 82 BPM | TEMPERATURE: 98.3 F

## 2024-09-20 DIAGNOSIS — R65.20 SEVERE SEPSIS (HCC): ICD-10-CM

## 2024-09-20 DIAGNOSIS — N30.01 ACUTE CYSTITIS WITH HEMATURIA: Primary | ICD-10-CM

## 2024-09-20 DIAGNOSIS — E87.6 HYPOKALEMIA: ICD-10-CM

## 2024-09-20 DIAGNOSIS — N10 ACUTE PYELONEPHRITIS: Primary | ICD-10-CM

## 2024-09-20 DIAGNOSIS — A41.9 SEVERE SEPSIS (HCC): ICD-10-CM

## 2024-09-20 LAB
APPEARANCE UR: ABNORMAL
BACTERIA URNS QL MICRO: ABNORMAL /HPF
BASOPHILS # BLD: 0 K/UL (ref 0–0.1)
BASOPHILS NFR BLD: 0 % (ref 0–1)
BILIRUB UR QL CFM: NEGATIVE
BILIRUB UR QL: ABNORMAL
BLASTS NFR BLD MANUAL: 1 %
CA-I BLD-MCNC: 1.07 MMOL/L (ref 1.12–1.32)
CHLORIDE BLD-SCNC: 100 MMOL/L (ref 98–107)
COLOR UR: ABNORMAL
CREAT UR-MCNC: 1.67 MG/DL (ref 0.6–1.3)
DIFFERENTIAL METHOD BLD: ABNORMAL
EOSINOPHIL # BLD: 0 K/UL (ref 0–0.4)
EOSINOPHIL NFR BLD: 0 % (ref 0–7)
EPITH CASTS URNS QL MICRO: ABNORMAL /LPF
ERYTHROCYTE [DISTWIDTH] IN BLOOD BY AUTOMATED COUNT: 13.7 % (ref 11.5–14.5)
FLUAV RNA SPEC QL NAA+PROBE: NOT DETECTED
FLUBV RNA SPEC QL NAA+PROBE: NOT DETECTED
GLUCOSE BLD STRIP.AUTO-MCNC: 120 MG/DL (ref 65–100)
GLUCOSE UR STRIP.AUTO-MCNC: NEGATIVE MG/DL
HCT VFR BLD AUTO: 42.4 % (ref 36.6–50.3)
HGB BLD-MCNC: 14 G/DL (ref 12.1–17)
HGB UR QL STRIP: ABNORMAL
IMM GRANULOCYTES # BLD AUTO: 0 K/UL
IMM GRANULOCYTES NFR BLD AUTO: 0 %
INR PPP: 2 (ref 0.9–1.1)
KETONES UR QL STRIP.AUTO: NEGATIVE MG/DL
LACTATE BLD-SCNC: 3.02 MMOL/L (ref 0.4–2)
LEUKOCYTE ESTERASE UR QL STRIP.AUTO: ABNORMAL
LYMPHOCYTES # BLD: 1.3 K/UL (ref 0.8–3.5)
LYMPHOCYTES NFR BLD: 24 % (ref 12–49)
MCH RBC QN AUTO: 28.5 PG (ref 26–34)
MCHC RBC AUTO-ENTMCNC: 33 G/DL (ref 30–36.5)
MCV RBC AUTO: 86.4 FL (ref 80–99)
MONOCYTES # BLD: 0.1 K/UL (ref 0–1)
MONOCYTES NFR BLD: 2 % (ref 5–13)
NEUTS BAND NFR BLD MANUAL: 4 % (ref 0–6)
NEUTS SEG # BLD: 3.9 K/UL (ref 1.8–8)
NEUTS SEG NFR BLD: 69 % (ref 32–75)
NITRITE UR QL STRIP.AUTO: POSITIVE
NRBC # BLD: 0 K/UL (ref 0–0.01)
NRBC BLD-RTO: 0 PER 100 WBC
PERFORMED BY:: ABNORMAL
PH UR STRIP: 6 (ref 5–8)
PLATELET # BLD AUTO: 189 K/UL (ref 150–400)
PLATELET COMMENT: ABNORMAL
PMV BLD AUTO: 9.9 FL (ref 8.9–12.9)
POTASSIUM BLD-SCNC: 3.1 MMOL/L (ref 3.5–5.5)
PROT UR STRIP-MCNC: 100 MG/DL
PROTHROMBIN TIME: 22.9 SEC (ref 11.9–14.6)
RBC # BLD AUTO: 4.91 M/UL (ref 4.1–5.7)
RBC #/AREA URNS HPF: ABNORMAL /HPF (ref 0–5)
RBC MORPH BLD: ABNORMAL
SARS-COV-2 RNA RESP QL NAA+PROBE: NOT DETECTED
SERVICE CMNT-IMP: ABNORMAL
SODIUM BLD-SCNC: 135 MMOL/L (ref 136–145)
SP GR UR REFRACTOMETRY: 1.02 (ref 1–1.03)
SPECIMEN SITE: ABNORMAL
URINE CULTURE IF INDICATED: ABNORMAL
UROBILINOGEN UR QL STRIP.AUTO: 4 EU/DL (ref 0.2–1)
WBC # BLD AUTO: 5.3 K/UL (ref 4.1–11.1)
WBC URNS QL MICRO: >100 /HPF (ref 0–4)

## 2024-09-20 PROCEDURE — 2580000003 HC RX 258: Performed by: STUDENT IN AN ORGANIZED HEALTH CARE EDUCATION/TRAINING PROGRAM

## 2024-09-20 PROCEDURE — 83690 ASSAY OF LIPASE: CPT

## 2024-09-20 PROCEDURE — 83735 ASSAY OF MAGNESIUM: CPT

## 2024-09-20 PROCEDURE — 93005 ELECTROCARDIOGRAM TRACING: CPT | Performed by: STUDENT IN AN ORGANIZED HEALTH CARE EDUCATION/TRAINING PROGRAM

## 2024-09-20 PROCEDURE — 83880 ASSAY OF NATRIURETIC PEPTIDE: CPT

## 2024-09-20 PROCEDURE — 71045 X-RAY EXAM CHEST 1 VIEW: CPT

## 2024-09-20 PROCEDURE — 84484 ASSAY OF TROPONIN QUANT: CPT

## 2024-09-20 PROCEDURE — 87636 SARSCOV2 & INF A&B AMP PRB: CPT

## 2024-09-20 PROCEDURE — 70498 CT ANGIOGRAPHY NECK: CPT

## 2024-09-20 PROCEDURE — 82077 ASSAY SPEC XCP UR&BREATH IA: CPT

## 2024-09-20 PROCEDURE — 85025 COMPLETE CBC W/AUTO DIFF WBC: CPT

## 2024-09-20 PROCEDURE — 71046 X-RAY EXAM CHEST 2 VIEWS: CPT

## 2024-09-20 PROCEDURE — 83605 ASSAY OF LACTIC ACID: CPT

## 2024-09-20 PROCEDURE — 80143 DRUG ASSAY ACETAMINOPHEN: CPT

## 2024-09-20 PROCEDURE — 80053 COMPREHEN METABOLIC PANEL: CPT

## 2024-09-20 PROCEDURE — 87186 SC STD MICRODIL/AGAR DIL: CPT

## 2024-09-20 PROCEDURE — 0042T CT BRAIN PERFUSION: CPT

## 2024-09-20 PROCEDURE — 87086 URINE CULTURE/COLONY COUNT: CPT

## 2024-09-20 PROCEDURE — 70450 CT HEAD/BRAIN W/O DYE: CPT

## 2024-09-20 PROCEDURE — 85610 PROTHROMBIN TIME: CPT

## 2024-09-20 PROCEDURE — 74176 CT ABD & PELVIS W/O CONTRAST: CPT

## 2024-09-20 PROCEDURE — 80179 DRUG ASSAY SALICYLATE: CPT

## 2024-09-20 PROCEDURE — 84145 PROCALCITONIN (PCT): CPT

## 2024-09-20 PROCEDURE — 82140 ASSAY OF AMMONIA: CPT

## 2024-09-20 PROCEDURE — 6360000004 HC RX CONTRAST MEDICATION: Performed by: STUDENT IN AN ORGANIZED HEALTH CARE EDUCATION/TRAINING PROGRAM

## 2024-09-20 PROCEDURE — 84443 ASSAY THYROID STIM HORMONE: CPT

## 2024-09-20 PROCEDURE — 87154 CUL TYP ID BLD PTHGN 6+ TRGT: CPT

## 2024-09-20 PROCEDURE — 82550 ASSAY OF CK (CPK): CPT

## 2024-09-20 PROCEDURE — 87040 BLOOD CULTURE FOR BACTERIA: CPT

## 2024-09-20 PROCEDURE — 87088 URINE BACTERIA CULTURE: CPT

## 2024-09-20 PROCEDURE — 87077 CULTURE AEROBIC IDENTIFY: CPT

## 2024-09-20 PROCEDURE — 99285 EMERGENCY DEPT VISIT HI MDM: CPT

## 2024-09-20 PROCEDURE — 96361 HYDRATE IV INFUSION ADD-ON: CPT

## 2024-09-20 PROCEDURE — 99284 EMERGENCY DEPT VISIT MOD MDM: CPT

## 2024-09-20 PROCEDURE — 81001 URINALYSIS AUTO W/SCOPE: CPT

## 2024-09-20 PROCEDURE — 36415 COLL VENOUS BLD VENIPUNCTURE: CPT

## 2024-09-20 PROCEDURE — 96365 THER/PROPH/DIAG IV INF INIT: CPT

## 2024-09-20 PROCEDURE — 6360000002 HC RX W HCPCS: Performed by: STUDENT IN AN ORGANIZED HEALTH CARE EDUCATION/TRAINING PROGRAM

## 2024-09-20 PROCEDURE — 80047 BASIC METABLC PNL IONIZED CA: CPT

## 2024-09-20 RX ORDER — CIPROFLOXACIN 500 MG/1
500 TABLET, FILM COATED ORAL 2 TIMES DAILY
Qty: 20 TABLET | Refills: 0 | Status: ON HOLD | OUTPATIENT
Start: 2024-09-20 | End: 2024-09-26 | Stop reason: HOSPADM

## 2024-09-20 RX ORDER — 0.9 % SODIUM CHLORIDE 0.9 %
30 INTRAVENOUS SOLUTION INTRAVENOUS ONCE
Status: COMPLETED | OUTPATIENT
Start: 2024-09-20 | End: 2024-09-20

## 2024-09-20 RX ORDER — IOPAMIDOL 755 MG/ML
100 INJECTION, SOLUTION INTRAVASCULAR
Status: COMPLETED | OUTPATIENT
Start: 2024-09-20 | End: 2024-09-20

## 2024-09-20 RX ORDER — AMLODIPINE BESYLATE 10 MG/1
10 TABLET ORAL DAILY
Status: ON HOLD | COMMUNITY
End: 2024-09-26 | Stop reason: HOSPADM

## 2024-09-20 RX ORDER — WARFARIN SODIUM 5 MG/1
5 TABLET ORAL SEE ADMIN INSTRUCTIONS
COMMUNITY

## 2024-09-20 RX ADMIN — IOPAMIDOL 100 ML: 755 INJECTION, SOLUTION INTRAVENOUS at 22:14

## 2024-09-20 RX ADMIN — SODIUM CHLORIDE 2721 ML: 9 INJECTION, SOLUTION INTRAVENOUS at 22:00

## 2024-09-20 RX ADMIN — PIPERACILLIN AND TAZOBACTAM 4500 MG: 4; .5 INJECTION, POWDER, LYOPHILIZED, FOR SOLUTION INTRAVENOUS at 23:41

## 2024-09-20 ASSESSMENT — LIFESTYLE VARIABLES
HOW OFTEN DO YOU HAVE A DRINK CONTAINING ALCOHOL: NEVER
HOW MANY STANDARD DRINKS CONTAINING ALCOHOL DO YOU HAVE ON A TYPICAL DAY: PATIENT DOES NOT DRINK

## 2024-09-20 ASSESSMENT — PAIN SCALES - GENERAL: PAINLEVEL_OUTOF10: 5

## 2024-09-20 ASSESSMENT — PAIN - FUNCTIONAL ASSESSMENT: PAIN_FUNCTIONAL_ASSESSMENT: 0-10

## 2024-09-20 NOTE — DISCHARGE INSTRUCTIONS
Thank you for choosing our Emergency Department for your care.  It is our privilege to care for you in your time of need.  In the next several days, you may receive a survey via email or mailed to your home about your experience with our team.  We would greatly appreciate you taking a few minutes to complete the survey, as we use this information to learn what we have done well and what we could be doing better. Thank you for trusting us with your care!    Below you will find a list of your tests from today's visit.   Labs  Recent Results (from the past 12 hour(s))   Urinalysis with Reflex to Culture    Collection Time: 09/20/24 10:53 AM    Specimen: Urine   Result Value Ref Range    Color, UA Dark Yellow      Appearance Cloudy (A) Clear      Specific Gravity, UA 1.020 1.003 - 1.030      pH, Urine 6.0 5.0 - 8.0      Protein,  (A) Negative mg/dL    Glucose, Ur Negative Negative mg/dL    Ketones, Urine Negative Negative mg/dL    Bilirubin, Urine Small (A) Negative      Blood, Urine Large (A) Negative      Urobilinogen, Urine 4.0 (H) 0.2 - 1.0 EU/dL    Nitrite, Urine Positive (A) Negative      Leukocyte Esterase, Urine Large (A) Negative      Bilirubin Confirmation, UA Negative Negative      WBC, UA >100 (H) 0 - 4 /hpf    RBC, UA 10-20 0 - 5 /hpf    Epithelial Cells, UA Few Few /lpf    BACTERIA, URINE 3+ (A) Negative /hpf    Urine Culture if Indicated Urine Culture Ordered (A) Culture not indicated by UA result     COVID-19 & Influenza Combo    Collection Time: 09/20/24 10:53 AM    Specimen: Nasopharyngeal   Result Value Ref Range    SARS-CoV-2, PCR Not Detected Not Detected      Rapid Influenza A By PCR Not Detected Not Detected      Rapid Influenza B By PCR Not Detected Not Detected         Radiologic Studies  XR CHEST (2 VW)   Final Result   No acute cardiopulmonary disease.         Electronically signed by Jesse Alexander MD

## 2024-09-21 PROBLEM — A41.9 SEVERE SEPSIS (HCC): Status: ACTIVE | Noted: 2024-09-21

## 2024-09-21 PROBLEM — R65.20 SEVERE SEPSIS (HCC): Status: ACTIVE | Noted: 2024-09-21

## 2024-09-21 LAB
ACCESSION NUMBER, LLC1M: ABNORMAL
ACINETOBACTER CALCOAC BAUMANNII COMPLEX BY PCR: NOT DETECTED
ALBUMIN SERPL-MCNC: 2.8 G/DL (ref 3.5–5)
ALBUMIN/GLOB SERPL: 0.8 (ref 1.1–2.2)
ALP SERPL-CCNC: 158 U/L (ref 45–117)
ALT SERPL-CCNC: 29 U/L (ref 12–78)
AMMONIA PLAS-SCNC: 11 UMOL/L
AMPHET UR QL SCN: NEGATIVE
ANION GAP SERPL CALC-SCNC: 12 MMOL/L (ref 2–12)
APAP SERPL-MCNC: <2 UG/ML (ref 10–30)
APPEARANCE UR: ABNORMAL
AST SERPL W P-5'-P-CCNC: 25 U/L (ref 15–37)
BACTERIA URNS QL MICRO: ABNORMAL /HPF
BACTEROIDES FRAGILIS BY PCR: NOT DETECTED
BARBITURATES UR QL SCN: NEGATIVE
BENZODIAZ UR QL: NEGATIVE
BILIRUB SERPL-MCNC: 1.4 MG/DL (ref 0.2–1)
BILIRUB UR QL: NEGATIVE
BIOFIRE TEST COMMENT: ABNORMAL
BLACTX-M ISLT/SPM QL: DETECTED
BLAIMP ISLT/SPM QL: NOT DETECTED
BLAKPC ISLT/SPM QL: NOT DETECTED
BLAOXA-48-LIKE ISLT/SPM QL: NOT DETECTED
BLAVIM ISLT/SPM QL: NOT DETECTED
BNP SERPL-MCNC: 660 PG/ML
BUN SERPL-MCNC: 20 MG/DL (ref 6–20)
BUN/CREAT SERPL: 9 (ref 12–20)
CA-I BLD-MCNC: 7.9 MG/DL (ref 8.5–10.1)
CANDIDA ALBICANS BY PCR: NOT DETECTED
CANDIDA AURIS BY PCR: NOT DETECTED
CANDIDA GLABRATA: NOT DETECTED
CANDIDA KRUSEI BY PCR: NOT DETECTED
CANDIDA PARAPSILOSIS BY PCR: NOT DETECTED
CANDIDA TROPICALIS BY PCR: NOT DETECTED
CANNABINOIDS UR QL SCN: NEGATIVE
CHLORIDE SERPL-SCNC: 104 MMOL/L (ref 97–108)
CK SERPL-CCNC: 198 U/L (ref 39–308)
CO2 SERPL-SCNC: 19 MMOL/L (ref 21–32)
COCAINE UR QL SCN: NEGATIVE
COLISTIN RES MCR-1 ISLT/SPM QL: NOT DETECTED
COLOR UR: ABNORMAL
CREAT SERPL-MCNC: 2.17 MG/DL (ref 0.7–1.3)
CRYPTOCOCCUS NEOFORMANS/GATTII BY PCR: NOT DETECTED
DATE LAST DOSE: NOT DETECTED
DATE LAST DOSE: NOT DETECTED
DOSE AMOUNT: NOT DETECTED UNITS
DOSE AMOUNT: NOT DETECTED UNITS
ENTEROBACTER CLOACAE COMPLEX BY PCR: NOT DETECTED
ENTEROBACTERALES BY PCR: DETECTED
ENTEROCOCCUS FAECALIS BY PCR: NOT DETECTED
ENTEROCOCCUS FAECIUM BY PCR: NOT DETECTED
EPITH CASTS URNS QL MICRO: ABNORMAL /LPF
ESCHERICHIA COLI: DETECTED
ETHANOL SERPL-MCNC: <10 MG/DL (ref 0–0.08)
FLUAV RNA SPEC QL NAA+PROBE: NOT DETECTED
FLUBV RNA SPEC QL NAA+PROBE: NOT DETECTED
GLOBULIN SER CALC-MCNC: 3.3 G/DL (ref 2–4)
GLUCOSE SERPL-MCNC: 114 MG/DL (ref 65–100)
GLUCOSE UR STRIP.AUTO-MCNC: NEGATIVE MG/DL
HAEMOPHILUS INFLUENZAE BY PCR: NOT DETECTED
HGB UR QL STRIP: ABNORMAL
KETONES UR QL STRIP.AUTO: NEGATIVE MG/DL
KLEBSIELLA AEROGENES BY PCR: NOT DETECTED
KLEBSIELLA OXYTOCA BY PCR: NOT DETECTED
KLEBSIELLA PNEUMONIAE GROUP BY PCR: NOT DETECTED
LACTATE BLD-SCNC: 2.63 MMOL/L (ref 0.4–2)
LACTATE BLD-SCNC: 2.98 MMOL/L (ref 0.4–2)
LEUKOCYTE ESTERASE UR QL STRIP.AUTO: ABNORMAL
LIPASE SERPL-CCNC: 32 U/L (ref 13–75)
LISTERIA MONOCYTOGENES BY PCR: NOT DETECTED
Lab: NORMAL
MAGNESIUM SERPL-MCNC: 1.8 MG/DL (ref 1.6–2.4)
METHADONE UR QL: NEGATIVE
MUCOUS THREADS URNS QL MICRO: ABNORMAL /LPF
NEISSERIA MENINGITIDIS BY PCR: NOT DETECTED
NITRITE UR QL STRIP.AUTO: NEGATIVE
OPIATES UR QL: NEGATIVE
PCP UR QL: NEGATIVE
PERFORMED BY:: ABNORMAL
PERFORMED BY:: ABNORMAL
PH UR STRIP: 5 (ref 5–8)
POTASSIUM SERPL-SCNC: 3.1 MMOL/L (ref 3.5–5.1)
PROCALCITONIN SERPL-MCNC: 61 NG/ML
PROT SERPL-MCNC: 6.1 G/DL (ref 6.4–8.2)
PROT UR STRIP-MCNC: 30 MG/DL
PROTEUS BY PCR: NOT DETECTED
PSEUDOMONAS AERUGINOSA, PSAEP: NOT DETECTED
RBC #/AREA URNS HPF: ABNORMAL /HPF (ref 0–5)
RESISTANT GENE NDM BY PCR: NOT DETECTED
RESISTANT GENE TARGETS: ABNORMAL
SALICYLATES SERPL-MCNC: <1.7 MG/DL (ref 2.8–20)
SALMONELLA SPECIES BY PCR: NOT DETECTED
SARS-COV-2 RNA RESP QL NAA+PROBE: NOT DETECTED
SERRATIA MARCESCENS BY PCR: NOT DETECTED
SODIUM SERPL-SCNC: 135 MMOL/L (ref 136–145)
SP GR UR REFRACTOMETRY: >1.03 (ref 1–1.03)
STAPHYLOCOCCUS AUREUS: NOT DETECTED
STAPHYLOCOCCUS EPIDERMIDIS BY PCR: NOT DETECTED
STAPHYLOCOCCUS LUGDUNENSIS BY PCR: NOT DETECTED
STAPHYLOCOCCUS: NOT DETECTED
STENOTROPHOMONAS MALTOPHILIA BY PCR: NOT DETECTED
STREPTOCOCCUS AGALACTIAE (GROUP B): NOT DETECTED
STREPTOCOCCUS PNEUMONIAE , SPNP: NOT DETECTED
STREPTOCOCCUS PYOGENES (GROUP A), SPYOP: NOT DETECTED
STREPTOCOCCUS: NOT DETECTED
TROPONIN I SERPL HS-MCNC: 42 NG/L (ref 0–76)
TROPONIN I SERPL HS-MCNC: 82 NG/L (ref 0–76)
TROPONIN I SERPL HS-MCNC: 87 NG/L (ref 0–76)
TSH SERPL DL<=0.05 MIU/L-ACNC: 2.29 UIU/ML (ref 0.36–3.74)
URINE CULTURE IF INDICATED: ABNORMAL
UROBILINOGEN UR QL STRIP.AUTO: 0.1 EU/DL (ref 0.1–1)
WBC URNS QL MICRO: ABNORMAL /HPF (ref 0–4)

## 2024-09-21 PROCEDURE — 80307 DRUG TEST PRSMV CHEM ANLYZR: CPT

## 2024-09-21 PROCEDURE — 84484 ASSAY OF TROPONIN QUANT: CPT

## 2024-09-21 PROCEDURE — 96365 THER/PROPH/DIAG IV INF INIT: CPT

## 2024-09-21 PROCEDURE — 2580000003 HC RX 258: Performed by: STUDENT IN AN ORGANIZED HEALTH CARE EDUCATION/TRAINING PROGRAM

## 2024-09-21 PROCEDURE — 87086 URINE CULTURE/COLONY COUNT: CPT

## 2024-09-21 PROCEDURE — 81001 URINALYSIS AUTO W/SCOPE: CPT

## 2024-09-21 PROCEDURE — 96366 THER/PROPH/DIAG IV INF ADDON: CPT

## 2024-09-21 PROCEDURE — 6360000002 HC RX W HCPCS: Performed by: STUDENT IN AN ORGANIZED HEALTH CARE EDUCATION/TRAINING PROGRAM

## 2024-09-21 PROCEDURE — 36415 COLL VENOUS BLD VENIPUNCTURE: CPT

## 2024-09-21 PROCEDURE — 83605 ASSAY OF LACTIC ACID: CPT

## 2024-09-21 PROCEDURE — 2580000003 HC RX 258: Performed by: INTERNAL MEDICINE

## 2024-09-21 PROCEDURE — 6370000000 HC RX 637 (ALT 250 FOR IP): Performed by: INTERNAL MEDICINE

## 2024-09-21 PROCEDURE — 96367 TX/PROPH/DG ADDL SEQ IV INF: CPT

## 2024-09-21 PROCEDURE — 1100000000 HC RM PRIVATE

## 2024-09-21 PROCEDURE — 6360000002 HC RX W HCPCS: Performed by: INTERNAL MEDICINE

## 2024-09-21 PROCEDURE — 84154 ASSAY OF PSA FREE: CPT

## 2024-09-21 PROCEDURE — 99222 1ST HOSP IP/OBS MODERATE 55: CPT | Performed by: PSYCHIATRY & NEUROLOGY

## 2024-09-21 PROCEDURE — 84153 ASSAY OF PSA TOTAL: CPT

## 2024-09-21 RX ORDER — WARFARIN SODIUM 5 MG/1
5 TABLET ORAL SEE ADMIN INSTRUCTIONS
Status: DISCONTINUED | OUTPATIENT
Start: 2024-09-21 | End: 2024-09-21

## 2024-09-21 RX ORDER — SODIUM CHLORIDE, SODIUM LACTATE, POTASSIUM CHLORIDE, AND CALCIUM CHLORIDE .6; .31; .03; .02 G/100ML; G/100ML; G/100ML; G/100ML
1000 INJECTION, SOLUTION INTRAVENOUS
Status: COMPLETED | OUTPATIENT
Start: 2024-09-21 | End: 2024-09-21

## 2024-09-21 RX ORDER — POTASSIUM CHLORIDE 1500 MG/1
40 TABLET, EXTENDED RELEASE ORAL ONCE
Status: COMPLETED | OUTPATIENT
Start: 2024-09-21 | End: 2024-09-21

## 2024-09-21 RX ORDER — POTASSIUM CHLORIDE 7.45 MG/ML
10 INJECTION INTRAVENOUS
Status: COMPLETED | OUTPATIENT
Start: 2024-09-21 | End: 2024-09-21

## 2024-09-21 RX ORDER — SODIUM CHLORIDE 9 MG/ML
125 INJECTION, SOLUTION INTRAVENOUS ONCE
Status: COMPLETED | OUTPATIENT
Start: 2024-09-21 | End: 2024-09-21

## 2024-09-21 RX ORDER — SODIUM CHLORIDE 9 MG/ML
INJECTION, SOLUTION INTRAVENOUS CONTINUOUS
Status: DISCONTINUED | OUTPATIENT
Start: 2024-09-21 | End: 2024-09-26 | Stop reason: HOSPADM

## 2024-09-21 RX ORDER — TAMSULOSIN HYDROCHLORIDE 0.4 MG/1
0.4 CAPSULE ORAL DAILY
Status: DISCONTINUED | OUTPATIENT
Start: 2024-09-21 | End: 2024-09-26 | Stop reason: HOSPADM

## 2024-09-21 RX ORDER — SOTALOL HYDROCHLORIDE 80 MG/1
40 TABLET ORAL 2 TIMES DAILY
Status: DISCONTINUED | OUTPATIENT
Start: 2024-09-21 | End: 2024-09-26 | Stop reason: HOSPADM

## 2024-09-21 RX ORDER — WARFARIN SODIUM 5 MG/1
5 TABLET ORAL
Status: COMPLETED | OUTPATIENT
Start: 2024-09-21 | End: 2024-09-21

## 2024-09-21 RX ADMIN — PIPERACILLIN AND TAZOBACTAM 3375 MG: 3; .375 INJECTION, POWDER, LYOPHILIZED, FOR SOLUTION INTRAVENOUS at 10:59

## 2024-09-21 RX ADMIN — SODIUM CHLORIDE: 9 INJECTION, SOLUTION INTRAVENOUS at 10:54

## 2024-09-21 RX ADMIN — POTASSIUM CHLORIDE 40 MEQ: 1500 TABLET, EXTENDED RELEASE ORAL at 10:45

## 2024-09-21 RX ADMIN — SOTALOL HYDROCHLORIDE 40 MG: 80 TABLET ORAL at 20:49

## 2024-09-21 RX ADMIN — WARFARIN SODIUM 5 MG: 5 TABLET ORAL at 20:50

## 2024-09-21 RX ADMIN — SODIUM CHLORIDE: 9 INJECTION, SOLUTION INTRAVENOUS at 18:57

## 2024-09-21 RX ADMIN — VANCOMYCIN HYDROCHLORIDE 2250 MG: 1.25 INJECTION, POWDER, LYOPHILIZED, FOR SOLUTION INTRAVENOUS at 00:46

## 2024-09-21 RX ADMIN — TAMSULOSIN HYDROCHLORIDE 0.4 MG: 0.4 CAPSULE ORAL at 10:51

## 2024-09-21 RX ADMIN — POTASSIUM CHLORIDE 10 MEQ: 7.46 INJECTION, SOLUTION INTRAVENOUS at 02:36

## 2024-09-21 RX ADMIN — POTASSIUM CHLORIDE 10 MEQ: 7.46 INJECTION, SOLUTION INTRAVENOUS at 01:26

## 2024-09-21 RX ADMIN — PIPERACILLIN AND TAZOBACTAM 3375 MG: 3; .375 INJECTION, POWDER, LYOPHILIZED, FOR SOLUTION INTRAVENOUS at 19:00

## 2024-09-21 RX ADMIN — SODIUM CHLORIDE 125 ML/HR: 9 INJECTION, SOLUTION INTRAVENOUS at 05:12

## 2024-09-21 RX ADMIN — SODIUM CHLORIDE, POTASSIUM CHLORIDE, SODIUM LACTATE AND CALCIUM CHLORIDE 1000 ML: 600; 310; 30; 20 INJECTION, SOLUTION INTRAVENOUS at 01:23

## 2024-09-21 RX ADMIN — SOTALOL HYDROCHLORIDE 40 MG: 80 TABLET ORAL at 10:44

## 2024-09-21 ASSESSMENT — PAIN SCALES - GENERAL
PAINLEVEL_OUTOF10: 0
PAINLEVEL_OUTOF10: 0

## 2024-09-21 NOTE — PROGRESS NOTES
Called [facility] and spoke to [facility] for tele-neuro consult general rounding this morning.

## 2024-09-21 NOTE — H&P
Department of Internal Medicine  General Internal Medicine  Attending History and Physical      CHIEF COMPLAINT: Sepsis severe  Severe UTI  Complicated UTI  BPH  History of present prostate biopsy  Atrial fibrillation  Acute kidney injury  Volume depletion  Hypokalemia   reason for Admission:      History Obtained From:  patient, electronic medical record    HISTORY OF PRESENT ILLNESS:      The patient is a 63 y.o. male with significant past medical history of hypertension and atrial fibrillation who presents with weakness lethargy and malaise patient recently had a prostate biopsy in emergency department creatinine was elevated potassium was low and CT of the abdomen and pelvis shows severe cystitis he has a past history of BPH    Past Medical History:        Diagnosis Date    Hypertension      Past Surgical History:        Procedure Laterality Date    AORTIC VALVE REPLACEMENT      CHEST SURGERY      thoracic aortic dissection and valve repair at [facility]     Immunizations:                Influenza:  Not indicated            Pneumococcal Polysaccharide:  Not indicated    Medications Prior to Admission:    Medications Prior to Admission: amLODIPine (NORVASC) 10 MG tablet, Take 1 tablet by mouth daily  Sotalol HCl AF 80 MG TABS, Take 40 mg by mouth 2 times daily  warfarin (COUMADIN) 5 MG tablet, Take 1 tablet by mouth See Admin Instructions 5 mg fri sat sun mond  7.0 mg tues Wednesday thursd  ciprofloxacin (CIPRO) 500 MG tablet, Take 1 tablet by mouth 2 times daily for 10 days    Allergies:  Lidocaine    Social History:   TOBACCO:   reports that he has never smoked. He has never used smokeless tobacco.  ETOH:   reports that he does not currently use alcohol.    Family History:   No family history on file.  REVIEW OF SYSTEMS:  CONSTITUTIONAL:  negative except for  fatigue and malaise  EYES:  negative  HEENT:  negative  RESPIRATORY:  negative  CARDIOVASCULAR:  negative  GASTROINTESTINAL:  negative  GENITOURINARY:

## 2024-09-21 NOTE — ED PROVIDER NOTES
[facility] EMERGENCY DEPT  EMERGENCY DEPARTMENT HISTORY AND PHYSICAL EXAM      Date: 9/20/2024  Patient Name: Manuel Combs Jr.  MRN: [id number]  Birthdate 1960  Date of evaluation: 9/20/2024  Provider: Viri Bond MD   Note Started: 7:39 PM EDT 9/21/24    HISTORY OF PRESENT ILLNESS     Chief Complaint   Patient presents with    Generalized Body Aches       History Provided By: Patient    HPI: Manuel Combs Jr. is a 63 y.o. male with pmhx as reviewed below who presents with low grade fever and myalgias. Pt reports he had a fever to 100.1 last night. This morning reports his neck felt achy, denies any neck stiffness, headaches, cough, congestion, sore throat. Does report that he had a prostate biopsy 1 wk ago. Has had some hematuria but was told this would be normal.     PAST MEDICAL HISTORY   Past Medical History:  Past Medical History:   Diagnosis Date    Hypertension        Past Surgical History:  Past Surgical History:   Procedure Laterality Date    AORTIC VALVE REPLACEMENT      CHEST SURGERY      thoracic aortic dissection and valve repair at [facility]       Family History:  No family history on file.    Social History:  Social History     Tobacco Use    Smoking status: Never    Smokeless tobacco: Never   Substance Use Topics    Alcohol use: Not Currently    Drug use: Not Currently       Allergies:  Allergies   Allergen Reactions    Lidocaine        PCP: Vladislav Irizarry Jr., APRN - NP    Current Meds:   No current facility-administered medications for this encounter.     No current outpatient medications on file.     Facility-Administered Medications Ordered in Other Encounters   Medication Dose Route Frequency Provider Last Rate Last Admin    sotalol (BETAPACE) tablet 40 mg  40 mg Oral BID Eliu Medeiros MD   40 mg at 09/21/24 1044    piperacillin-tazobactam (ZOSYN) 3,375 mg in sodium chloride 0.9 % 50 mL IVPB (mini-bag)  3,375 mg IntraVENous Q8H Eliu Medeiros MD 12.5 mL/hr at 09/21/24       ED Course User Index  [DM] Viri Bond MD       SEPSIS Reassessment: Sepsis reassessment not applicable    Clinical Management Tools:  Not Applicable    Patient was given the following medications:  Medications - No data to display    CONSULTS: See ED Course/MDM for further details.  None     Social Determinants affecting Diagnosis/Treatment: None    Smoking Cessation: Not Applicable    PROCEDURES   Unless otherwise noted above, none  Procedures      CRITICAL CARE TIME   Patient does not meet Critical Care Time, 0 minutes    ED IMPRESSION     1. Acute cystitis with hematuria          DISPOSITION/PLAN   DISPOSITION Decision To Discharge 09/20/2024 12:03:51 PM  Condition at Disposition: Good    Discharge Note: The patient is stable for discharge home. The signs, symptoms, diagnosis, and discharge instructions have been discussed, understanding conveyed, and agreed upon. The patient is to follow up as recommended or return to ER should their symptoms worsen.      PATIENT REFERRED TO:  [facility] EMERGENCY DEPARTMENT  [address] [facility] [address] [phone]  Go to   As needed    Vladislav Irizarry Jr., APRN - NP  [address] [facility] [address] [phone]    Go to   As needed        DISCHARGE MEDICATIONS:     Medication List        START taking these medications      ciprofloxacin 500 MG tablet  Commonly known as: CIPRO  Take 1 tablet by mouth 2 times daily for 10 days            ASK your doctor about these medications      amLODIPine 10 MG tablet  Commonly known as: NORVASC     Sotalol HCl AF 80 MG Tabs     warfarin 5 MG tablet  Commonly known as: COUMADIN               Where to Get Your Medications        These medications were sent to [facility] [address] [place] [address] - [address] [place] [address] - P [phone] - F [phone]  [address] [place] [id number]      Phone: [phone]   ciprofloxacin 500 MG tablet           DISCONTINUED

## 2024-09-21 NOTE — ED NOTES
ED TO INPATIENT SBAR HANDOFF    Patient Name: Manuel Combs Jr.   Preferred Name: Manuel  : 1960  63 y.o.   Family/Caregiver Present: no   Code Status Order: Full Code  PO Status: NPO:Yes  Telemetry Order:   C-SSRS: Risk of Suicide: No Risk  Sitter no   Restraints:     Sepsis Risk Score      Situation  Chief Complaint   Patient presents with    Transient Ischemic Attack     Brief Description of Patient's Condition: came to ED for c/o diaphoresis, fever, right facial droop per EMS. On arrival to ED, altered mental status noted Recently had UTI, HTN  Mental Status: oriented  Arrived from:Home  Imaging:   XR CHEST PORTABLE   Final Result      No acute process on portable chest.         Electronically signed by MICHEL ALVARADO      CTA HEAD NECK W CONTRAST   Final Result   CTA Head:   1. No evidence of significant stenosis or aneurysm.      CTA Neck:   1. No evidence of significant stenosis.   2. Partially visualized stent graft in the descending thoracic aorta.      CT Brain Perfusion:   1. No acute abnormality.         Electronically signed by Hesham Willis      CT BRAIN PERFUSION   Final Result   CTA Head:   1. No evidence of significant stenosis or aneurysm.      CTA Neck:   1. No evidence of significant stenosis.   2. Partially visualized stent graft in the descending thoracic aorta.      CT Brain Perfusion:   1. No acute abnormality.         Electronically signed by Hesham Willis      CT ABDOMEN PELVIS WO CONTRAST Additional Contrast? None   Final Result   Imaging findings suggestive of a moderate to severe cystitis.          Incidental and/or nonemergent findings are as described in detail above.         Electronically signed by HUMAIRA WHITE      CT HEAD WO CONTRAST   Final Result   No acute process or change compared the prior exam.            Electronically signed by MICHEL ALVARADO        Abnormal labs:   Abnormal Labs Reviewed   CBC WITH AUTO DIFFERENTIAL - Abnormal; Notable for the following  normal limits   POC LACTIC ACID - Abnormal; Notable for the following components:    POC Lactic Acid 2.63 (*)     All other components within normal limits   POC CHEMISTRY (NA,K,ICA,GLU,CALC HCT/HGB,LACTATE,CREA,CL) - Abnormal; Notable for the following components:    POC Sodium 135 (*)     POC Potassium 3.1 (*)     POC Ionized Calcium 1.07 (*)     POC Creatinine 1.67 (*)     POC Glucose 120 (*)     eGFR, POC 46 (*)     POC Lactic Acid 3.02 (*)     All other components within normal limits       Background  Allergies:   Allergies   Allergen Reactions    Lidocaine      History:   Past Medical History:   Diagnosis Date    Hypertension        Assessment  Vitals:    Level of Consciousness: Alert (0)   Vitals:    09/21/24 0415 09/21/24 0430 09/21/24 0445 09/21/24 0515   BP: (!) 83/66 99/69 93/70 104/76   Pulse: 96 93 91 93   Resp: (!) 34 (!) 36 (!) 34 (!) 37   Temp:       TempSrc:       SpO2: 100% 100% 100%    Weight:       Height:         Deterioration Index (DI): Deterioration Index: 42.32  Deterioration Index (DI) Interventions Performed: Deterioration Index RN Interventions Performed : Charge RN Notified  O2 Flow Rate:    O2 Device:    Cardiac Rhythm:    Critical Lab Results: [unfilled]  Cultures: Cultures:Blood  NIH Score: NIH NIH Stroke Scale  Interval: Reassessment  Level of Consciousness (1a): Alert  LOC Questions (1b): Answers both correctly  LOC Commands (1c): Performs both tasks correctly  Best Gaze (2): Normal  Visual (3): No visual loss  Facial Palsy (4): Normal symmetrical movement  Motor Arm, Left (5a): No drift  Motor Arm, Right (5b): No drift  Motor Leg, Left (6a): No drift  Motor Leg, Right (6b): No drift  Limb Ataxia (7): Absent  Sensory (8): Normal  Best Language (9): No aphasia  Dysarthria (10): Normal  Extinction and Inattention (11): No abnormality  Total: 0   Active LDA's:   Peripheral IV 09/20/24 Distal;Left;Anterior Cephalic (Active)       Peripheral IV 09/21/24 Right Antecubital (Active)

## 2024-09-21 NOTE — CONSULTS
Eliu LEVIN MD        tamsulosin (FLOMAX) capsule 0.4 mg  0.4 mg Oral Daily Eliu Medeiros MD                Labs:     Recent Results (from the past 24 hour(s))   Urinalysis with Reflex to Culture    Collection Time: 09/20/24 10:53 AM    Specimen: Urine   Result Value Ref Range    Color, UA Dark Yellow      Appearance Cloudy (A) Clear      Specific Gravity, UA 1.020 1.003 - 1.030      pH, Urine 6.0 5.0 - 8.0      Protein,  (A) Negative mg/dL    Glucose, Ur Negative Negative mg/dL    Ketones, Urine Negative Negative mg/dL    Bilirubin, Urine Small (A) Negative      Blood, Urine Large (A) Negative      Urobilinogen, Urine 4.0 (H) 0.2 - 1.0 EU/dL    Nitrite, Urine Positive (A) Negative      Leukocyte Esterase, Urine Large (A) Negative      Bilirubin Confirmation, UA Negative Negative      WBC, UA >100 (H) 0 - 4 /hpf    RBC, UA 10-20 0 - 5 /hpf    Epithelial Cells, UA Few Few /lpf    BACTERIA, URINE 3+ (A) Negative /hpf    Urine Culture if Indicated Urine Culture Ordered (A) Culture not indicated by UA result     COVID-19 & Influenza Combo    Collection Time: 09/20/24 10:53 AM    Specimen: Nasopharyngeal   Result Value Ref Range    SARS-CoV-2, PCR Not Detected Not Detected      Rapid Influenza A By PCR Not Detected Not Detected      Rapid Influenza B By PCR Not Detected Not Detected     Ammonia    Collection Time: 09/20/24 11:02 PM   Result Value Ref Range    Ammonia 11 <32 umol/L   CBC with Auto Differential    Collection Time: 09/20/24 11:05 PM   Result Value Ref Range    WBC 5.3 4.1 - 11.1 K/uL    RBC 4.91 4.10 - 5.70 M/uL    Hemoglobin 14.0 12.1 - 17.0 g/dL    Hematocrit 42.4 36.6 - 50.3 %    MCV 86.4 80.0 - 99.0 FL    MCH 28.5 26.0 - 34.0 PG    MCHC 33.0 30.0 - 36.5 g/dL    RDW 13.7 11.5 - 14.5 %    Platelets 189 150 - 400 K/uL    MPV 9.9 8.9 - 12.9 FL    Nucleated RBCs 0.0 0.0  WBC    nRBC 0.00 0.00 - 0.01 K/uL    Neutrophils % 69 32 - 75 %    Band Neutrophils 4 0 - 6 %    Lymphocytes % 24 12 - 49 %    Result Value Ref Range    POC Lactic Acid 2.98 (HH) 0.40 - 2.00 mmol/L    Performed by: Andre Mccabe    Urinalysis with Reflex to Culture    Collection Time: 09/21/24  2:43 AM    Specimen: Urine   Result Value Ref Range    Color, UA Yellow/Straw      Appearance Turbid (A) Clear      Specific Gravity, UA >1.030 (H) 1.003 - 1.030    pH, Urine 5.0 5.0 - 8.0      Protein, UA 30 (A) Negative mg/dL    Glucose, Ur Negative Negative mg/dL    Ketones, Urine Negative Negative mg/dL    Bilirubin, Urine Negative Negative      Blood, Urine Large (A) Negative      Urobilinogen, Urine 0.1 0.1 - 1.0 EU/dL    Nitrite, Urine Negative Negative      Leukocyte Esterase, Urine Large (A) Negative      WBC, UA  0 - 4 /hpf    RBC, UA 10-20 0 - 5 /hpf    Epithelial Cells, UA Few Few /lpf    BACTERIA, URINE 2+ (A) Negative /hpf    Urine Culture if Indicated Urine Culture Ordered (A) Culture not indicated by UA result      Mucus, UA Trace (A) Negative /lpf   Urine Drug Screen    Collection Time: 09/21/24  2:43 AM   Result Value Ref Range    Amphetamine, Urine Negative Negative      Barbiturates, Urine Negative Negative      Benzodiazepines, Urine Negative Negative      Cocaine, Urine Negative Negative      Methadone, Urine Negative Negative      Opiates, Urine Negative Negative      Phencyclidine, Urine Negative Negative      THC, TH-Cannabinol, Urine Negative Negative      Comments:        This test is a screen for drugs of abuse in a medical setting only (i.e., they are unconfirmed results and as such must not be used for non-medical purposes, e.g.,employment testing, legal testing). Due to its inherent nature, false positive (FP) and false negative (FN) results may be obtained. Therefore, if necessary for medical care, recommend confirmation of positive findings by GC/MS.     Troponin    Collection Time: 09/21/24  2:56 AM   Result Value Ref Range    Troponin, High Sensitivity 87 (H) 0 - 76 ng/L   Troponin    Collection Time:

## 2024-09-21 NOTE — ED PROVIDER NOTES
[facility]  EMERGENCY DEPARTMENT ENCOUNTER NOTE    Date: 9/20/2024  Patient Name: Manuel Combs Jr.    History of Presenting Illness     Chief Complaint   Patient presents with    Transient Ischemic Attack       History obtained from: Patient    HPI: Manuel Combs Jr., 63 y.o. male with past medical history as listed and reviewed below presents for altered mental status.  The patient was recently diagnosed with UTI and is reportedly on antibiotics.  He also has a history of AVR on warfarin.  EMS were called by family for shortness of breath.  On their arrival, they noted a left-sided facial droop and the patient was leaning and stumbling and gait and falling to the left side.  Unclear head trauma.  The patient was noted to have a tympanic temperature of 105 on scene but had resolved on arrival.  The patient was very diaphoretic.  The patient will complains of dysuria.  Denies any headache, nausea, vomiting, shortness of breath, neck pain, stiffness, photophobia, abdominal pain, back pain, or any other symptoms.  He is confused.  History is limited.    Family added to the patient recently had a prostate biopsy.    Medical History   I reviewed the medical, surgical, family, and social history, as well as allergies:    PCP: Vladislav Irizarry Jr., APRN - NP    Past Medical History:  Past Medical History:   Diagnosis Date    Hypertension      Past Surgical History:  Past Surgical History:   Procedure Laterality Date    AORTIC VALVE REPLACEMENT      CHEST SURGERY      thoracic aortic dissection and valve repair at [facility]     Current Outpatient Medications:  Current Outpatient Medications   Medication Instructions    amLODIPine (NORVASC) 10 mg, Oral, DAILY    ciprofloxacin (CIPRO) 500 mg, Oral, 2 TIMES DAILY    Sotalol HCl AF 40 mg, Oral, 2 TIMES DAILY    warfarin (COUMADIN) 5 mg, Oral, SEE ADMIN INSTRUCTIONS, 5 mg fri sat sun mond  7.0 mg tues Wednesday thursd       Family  (ZOSYN) 4,500 mg in sodium chloride 0.9 % 100 mL IVPB (mini-bag) (0 mg IntraVENous Stopped 9/21/24 0011)   iopamidol (ISOVUE-370) 76 % injection 100 mL (100 mLs IntraVENous Given 9/20/24 2214)   potassium chloride 10 mEq/100 mL IVPB (Peripheral Line) (10 mEq IntraVENous New Bag 9/21/24 0236)   lactated ringers bolus 1,000 mL (0 mLs IntraVENous Stopped 9/21/24 0200)       Documentation Comments   - I am the first and primary provider for this patient and am the primary provider of record.  - Initial assessment performed. The patients presenting problems have been discussed, and the staff are in agreement with the care plan formulated and outlined with them.  I have encouraged them to ask questions as they arise throughout their visit.  - Available medical records, nursing notes, old EKGs, and EMS run sheets (if patient was EMS transported) were reviewed    Please note that this dictation was completed with Movatu, the computer voice recognition software.  Quite often unanticipated grammatical, syntax, homophones, and other interpretive errors are inadvertently transcribed by the computer software.  Please disregard these errors.  Please excuse any errors that have escaped final proofreading.     Handy Mahajan MD  09/21/24 3169

## 2024-09-21 NOTE — CARE COORDINATION
09/21/24 1444   Service Assessment   Patient Orientation Alert and Oriented   Cognition Alert   History Provided By Patient   Primary Caregiver Self   Support Systems Family Members   Patient's Healthcare Decision Maker is: Legal Next of Kin   PCP Verified by CM Yes   Last Visit to PCP Within last 3 months   Prior Functional Level Independent in ADLs/IADLs   Current Functional Level Independent in ADLs/IADLs   Can patient return to prior living arrangement Yes   Ability to make needs known: Good   Family able to assist with home care needs: Yes   Would you like for me to discuss the discharge plan with any other family members/significant others, and if so, who? No   Financial Resources None   Community Resources None     Patient confirmed demographics. Lives in 2-story home with 4-steps. Independent with ADLs. Uses [facility]. Current Disp: Home    Advance Care Planning     General Advance Care Planning (ACP) Conversation    Date of Conversation: 9/21/2024  Conducted with: Patient with Decision Making Capacity  Other persons present: None    Healthcare Decision Maker: No healthcare decision makers have been documented.     Today we documented Decision Maker(s) consistent with Legal Next of Kin hierarchy.  Content/Action Overview:  Has ACP document(s) on file - reflects the patient's care preferences  Reviewed DNR/DNI and patient elects Full Code (Attempt Resuscitation)  DCP      Length of Voluntary ACP Conversation in minutes:  <16 minutes (Non-Billable)    Tarah Martinez RN

## 2024-09-21 NOTE — PROGRESS NOTES
Warfarin Dosing Consult  Manuel Combs Jr. is a 63 y.o. male with afib. Pharmacy was consulted by Dr. Medeiros to dose and monitor warfarin.    INR Goal: 2-3    PTA Dose:   5 mg Fri, Sat, Sun, Mon  7mg Tues, Wed, Thurs    Drugs that may increase INR:None  Drugs that may decrease INR: None  Other current anticoagulants/ drugs that may increase bleeding risk: None  Risk factors: None  Daily INR ordered: Yes    Recent Labs     09/20/24  2305   HGB 14.0        Recent Labs     09/20/24  2305   ALT 29   AST 25     Recent Labs     09/20/24  2305   INR 2.0*       Date INR Previous Dose   9/20 @ 2305 2      Assessment/ Plan:  Hemoglobin appears stable, no sign of bleed  Patient reports last dose taken was yesterday  Patient is therapeutic - on lower range  Will order warfarin 5 mg PO x 1 dose.  Daily INR ordered through 9/21  Pharmacy will continue to monitor daily and adjust therapy as indicated.

## 2024-09-22 LAB
ANION GAP SERPL CALC-SCNC: 8 MMOL/L (ref 2–12)
ANION GAP SERPL CALC-SCNC: 9 MMOL/L (ref 2–12)
BACTERIA SPEC CULT: ABNORMAL
BACTERIA SPEC CULT: NORMAL
BACTERIA SPEC CULT: NORMAL
BASOPHILS # BLD: 0.1 K/UL (ref 0–0.1)
BASOPHILS NFR BLD: 0 % (ref 0–1)
BUN SERPL-MCNC: 12 MG/DL (ref 6–20)
BUN SERPL-MCNC: 15 MG/DL (ref 6–20)
BUN/CREAT SERPL: 11 (ref 12–20)
BUN/CREAT SERPL: 13 (ref 12–20)
CA-I BLD-MCNC: 8.1 MG/DL (ref 8.5–10.1)
CA-I BLD-MCNC: 8.1 MG/DL (ref 8.5–10.1)
CHLORIDE SERPL-SCNC: 111 MMOL/L (ref 97–108)
CHLORIDE SERPL-SCNC: 112 MMOL/L (ref 97–108)
CO2 SERPL-SCNC: 18 MMOL/L (ref 21–32)
CO2 SERPL-SCNC: 19 MMOL/L (ref 21–32)
COLONY COUNT, CNT: 1000
COLONY COUNT, CNT: ABNORMAL
COLONY COUNT, CNT: NORMAL
CREAT SERPL-MCNC: 1.13 MG/DL (ref 0.7–1.3)
CREAT SERPL-MCNC: 1.17 MG/DL (ref 0.7–1.3)
DIFFERENTIAL METHOD BLD: ABNORMAL
EKG ATRIAL RATE: 101 BPM
EKG DIAGNOSIS: NORMAL
EKG P AXIS: 67 DEGREES
EKG P-R INTERVAL: 158 MS
EKG Q-T INTERVAL: 360 MS
EKG QRS DURATION: 102 MS
EKG QTC CALCULATION (BAZETT): 466 MS
EKG R AXIS: 100 DEGREES
EKG T AXIS: 85 DEGREES
EKG VENTRICULAR RATE: 101 BPM
EOSINOPHIL # BLD: 0.1 K/UL (ref 0–0.4)
EOSINOPHIL NFR BLD: 0 % (ref 0–7)
ERYTHROCYTE [DISTWIDTH] IN BLOOD BY AUTOMATED COUNT: 14.6 % (ref 11.5–14.5)
GLUCOSE SERPL-MCNC: 103 MG/DL (ref 65–100)
GLUCOSE SERPL-MCNC: 105 MG/DL (ref 65–100)
HCT VFR BLD AUTO: 38.6 % (ref 36.6–50.3)
HGB BLD-MCNC: 12.8 G/DL (ref 12.1–17)
IMM GRANULOCYTES # BLD AUTO: 0.2 K/UL (ref 0–0.04)
IMM GRANULOCYTES NFR BLD AUTO: 1 % (ref 0–0.5)
INR PPP: 2 (ref 0.9–1.1)
INR PPP: 2 (ref 0.9–1.1)
LYMPHOCYTES # BLD: 1.9 K/UL (ref 0.8–3.5)
LYMPHOCYTES NFR BLD: 12 % (ref 12–49)
Lab: ABNORMAL
Lab: NORMAL
MCH RBC QN AUTO: 28.4 PG (ref 26–34)
MCHC RBC AUTO-ENTMCNC: 33.2 G/DL (ref 30–36.5)
MCV RBC AUTO: 85.8 FL (ref 80–99)
MONOCYTES # BLD: 1.5 K/UL (ref 0–1)
MONOCYTES NFR BLD: 9 % (ref 5–13)
NEUTS SEG # BLD: 12.4 K/UL (ref 1.8–8)
NEUTS SEG NFR BLD: 78 % (ref 32–75)
NRBC # BLD: 0 K/UL (ref 0–0.01)
NRBC BLD-RTO: 0 PER 100 WBC
PLATELET # BLD AUTO: 245 K/UL (ref 150–400)
PMV BLD AUTO: 10.6 FL (ref 8.9–12.9)
POTASSIUM SERPL-SCNC: 3.4 MMOL/L (ref 3.5–5.1)
POTASSIUM SERPL-SCNC: 3.4 MMOL/L (ref 3.5–5.1)
PROTHROMBIN TIME: 22.8 SEC (ref 11.9–14.6)
PROTHROMBIN TIME: 23.1 SEC (ref 11.9–14.6)
RBC # BLD AUTO: 4.5 M/UL (ref 4.1–5.7)
SODIUM SERPL-SCNC: 138 MMOL/L (ref 136–145)
SODIUM SERPL-SCNC: 139 MMOL/L (ref 136–145)
WBC # BLD AUTO: 16 K/UL (ref 4.1–11.1)

## 2024-09-22 PROCEDURE — 6360000002 HC RX W HCPCS: Performed by: INTERNAL MEDICINE

## 2024-09-22 PROCEDURE — 2580000003 HC RX 258: Performed by: INTERNAL MEDICINE

## 2024-09-22 PROCEDURE — 80048 BASIC METABOLIC PNL TOTAL CA: CPT

## 2024-09-22 PROCEDURE — 85025 COMPLETE CBC W/AUTO DIFF WBC: CPT

## 2024-09-22 PROCEDURE — 99222 1ST HOSP IP/OBS MODERATE 55: CPT | Performed by: INTERNAL MEDICINE

## 2024-09-22 PROCEDURE — 87040 BLOOD CULTURE FOR BACTERIA: CPT

## 2024-09-22 PROCEDURE — 36415 COLL VENOUS BLD VENIPUNCTURE: CPT

## 2024-09-22 PROCEDURE — 6370000000 HC RX 637 (ALT 250 FOR IP): Performed by: INTERNAL MEDICINE

## 2024-09-22 PROCEDURE — 85610 PROTHROMBIN TIME: CPT

## 2024-09-22 PROCEDURE — 1100000000 HC RM PRIVATE

## 2024-09-22 RX ORDER — ACETAMINOPHEN 325 MG/1
650 TABLET ORAL EVERY 4 HOURS PRN
Status: DISCONTINUED | OUTPATIENT
Start: 2024-09-22 | End: 2024-09-24

## 2024-09-22 RX ORDER — ERTAPENEM 1 G/1
1000 INJECTION, POWDER, LYOPHILIZED, FOR SOLUTION INTRAMUSCULAR; INTRAVENOUS EVERY 24 HOURS
Qty: 14 EACH | Refills: 0 | Status: SHIPPED | OUTPATIENT
Start: 2024-09-22 | End: 2024-09-25

## 2024-09-22 RX ORDER — WARFARIN SODIUM 5 MG/1
5 TABLET ORAL
Status: COMPLETED | OUTPATIENT
Start: 2024-09-22 | End: 2024-09-22

## 2024-09-22 RX ADMIN — PIPERACILLIN AND TAZOBACTAM 3375 MG: 3; .375 INJECTION, POWDER, LYOPHILIZED, FOR SOLUTION INTRAVENOUS at 01:24

## 2024-09-22 RX ADMIN — TAMSULOSIN HYDROCHLORIDE 0.4 MG: 0.4 CAPSULE ORAL at 10:24

## 2024-09-22 RX ADMIN — WARFARIN SODIUM 5 MG: 5 TABLET ORAL at 18:44

## 2024-09-22 RX ADMIN — SOTALOL HYDROCHLORIDE 40 MG: 80 TABLET ORAL at 21:24

## 2024-09-22 RX ADMIN — MEROPENEM 1000 MG: 1 INJECTION INTRAVENOUS at 21:25

## 2024-09-22 RX ADMIN — MEROPENEM 1000 MG: 1 INJECTION INTRAVENOUS at 13:24

## 2024-09-22 RX ADMIN — PIPERACILLIN AND TAZOBACTAM 3375 MG: 3; .375 INJECTION, POWDER, LYOPHILIZED, FOR SOLUTION INTRAVENOUS at 10:27

## 2024-09-22 RX ADMIN — ACETAMINOPHEN 650 MG: 325 TABLET ORAL at 13:19

## 2024-09-22 RX ADMIN — SOTALOL HYDROCHLORIDE 40 MG: 80 TABLET ORAL at 10:24

## 2024-09-22 RX ADMIN — SODIUM CHLORIDE: 9 INJECTION, SOLUTION INTRAVENOUS at 04:52

## 2024-09-22 RX ADMIN — SODIUM CHLORIDE: 9 INJECTION, SOLUTION INTRAVENOUS at 18:47

## 2024-09-22 ASSESSMENT — PAIN SCALES - GENERAL
PAINLEVEL_OUTOF10: 0

## 2024-09-22 NOTE — PLAN OF CARE
Problem: Infection - Adult  Goal: Absence of infection during hospitalization  Outcome: Not Progressing     Problem: Discharge Planning  Goal: Discharge to home or other facility with appropriate resources  Outcome: Progressing     Problem: Safety - Adult  Goal: Free from fall injury  Outcome: Progressing     Problem: Infection - Adult  Goal: Absence of infection at discharge  Flowsheets (Taken 9/22/2024 0253)  Absence of infection at discharge:   Assess and monitor for signs and symptoms of infection   Monitor lab/diagnostic results   Monitor all insertion sites i.e., indwelling lines, tubes and drains   Administer medications as ordered   Instruct and encourage patient and family to use good hand hygiene technique  Note: On  IV   Antibiotics    for   infection, has   + blood  cultures,has  low  grade  fever  tonight.     Problem: Infection - Adult  Goal: Absence of infection during hospitalization  Outcome: Not Progressing

## 2024-09-22 NOTE — CONSULTS
significant stenosis. 2. Partially visualized stent graft in the descending thoracic aorta. CT Brain Perfusion: 1. No acute abnormality. Electronically signed by Hesham Willis    CT BRAIN PERFUSION    Result Date: 9/20/2024  CTA Head: 1. No evidence of significant stenosis or aneurysm. CTA Neck: 1. No evidence of significant stenosis. 2. Partially visualized stent graft in the descending thoracic aorta. CT Brain Perfusion: 1. No acute abnormality. Electronically signed by Hesham Willis    CT ABDOMEN PELVIS WO CONTRAST Additional Contrast? None    Result Date: 9/20/2024  Imaging findings suggestive of a moderate to severe cystitis.  Incidental and/or nonemergent findings are as described in detail above. Electronically signed by HUMAIRA WHITE    CT HEAD WO CONTRAST    Result Date: 9/20/2024  No acute process or change compared the prior exam. Electronically signed by MICHEL ALVARADO    XR CHEST (2 VW)    Result Date: 9/20/2024  No acute cardiopulmonary disease. Electronically signed by Jesse Alexander MD      Assessment / Plan:     GNR bacteremia, with isolation of E. Coli from blood Cx, pending susceptibility         Fever spike of 100.9 F today, hemodynamically stable, on RA        Source likely  given recent prostate biopsy, ESBL E.coli isolated from urine Cx on 09/20        WBC WNLs, Procal 61.0. S/p Zosyn, currently on Meropenem        Continue on Meropenem, anticipating 14 days of antibiotics         Plans for d/c on Ertapenem for once daily dosing simplicity, d/c script on chart for CM         Routine labs in the morning     2. UTI, abnormal UA, GNR isolated from Cx 09/21      ESBL E.coli was isolated on 09/20      Continue on Meropenem as above     3. Suspected prostatitis/BPH, reports urinary urgency/hesitancy      Recently underwent prostate biopsy on 9/[facility] by [facility], results are pending      Will follow pending PSA, continue antibiotics as above     4. H/o aortic aneurysm s/p repair, has a prosthetic

## 2024-09-22 NOTE — PROGRESS NOTES
Hospitalist Progress Note  [facility]    Subjective:   Daily Progress Note: 2024 11:42 AM    Patient had a low-grade temperature  E. coli possible ESBL bacteremia  On IV Zosyn discontinue Zosyn start patient on IV Merrem  Cons ID    Current Facility-Administered Medications   Medication Dose Route Frequency    meropenem (MERREM) 1,000 mg in sodium chloride 0.9 % 100 mL IVPB (mini-bag)  1,000 mg IntraVENous Q8H    acetaminophen (TYLENOL) tablet 650 mg  650 mg Oral Q4H PRN    sotalol (BETAPACE) tablet 40 mg  40 mg Oral BID    0.9 % sodium chloride infusion   IntraVENous Continuous    tamsulosin (FLOMAX) capsule 0.4 mg  0.4 mg Oral Daily    warfarin placeholder: dosing by pharmacy   Other RX Placeholder        Review of Systems  Constitutional: negative for fevers, chills, sweats, and fatigue  Eyes: negative for redness and icterus  Ears, nose, mouth, throat, and face: negative for ear drainage, nasal congestion, sore throat, and voice change  Respiratory: negative for cough, wheezing, or dyspnea on exertion  Cardiovascular: negative for chest pain, dyspnea, palpitations, lower extremity edema  Gastrointestinal: negative for nausea, vomiting, diarrhea, and abdominal pain  Genitourinary:negative for frequency, dysuria, and hematuria  Integument/breast: negative for skin lesion(s) and dryness  Hematologic/lymphatic: negative for easy bruising, bleeding, and lymphadenopathy  Musculoskeletal:negative for neck pain, back pain, and muscle weakness  Neurological: negative for headaches, dizziness, and weakness  Behavioral/Psych: negative for anxiety and depression  Allergic/Immunologic: negative for urticaria and angioedema        Objective:     BP (!) 145/84   Pulse 86   Temp 99.3 °F (37.4 °C)   Resp 18   Ht 1.778 m (5' 10\")   Wt 90.7 kg (200 lb)   SpO2 99%   BMI 28.70 kg/m²  PreHospital Care  Ambulance Run Sheet Provided?: Yes      Temp (24hrs), Av.5 °F (37.5 °C), Min:97.5 °F (36.4

## 2024-09-22 NOTE — PROGRESS NOTES
Dr Medeiros notified that blood cultures from 9-20-24 showed gram negative rods in 2 bottles. No new orders

## 2024-09-22 NOTE — PROGRESS NOTES
Warfarin Dosing Consult  Manuel Combs Jr. is a 63 y.o. male with afib. Pharmacy was consulted by Dr. Medeiros to dose and monitor warfarin.    INR Goal: 2-3    PTA Dose:   5 mg Fri, Sat, Sun, Mon  7mg Tues, Wed, Thurs    Drugs that may increase INR:None  Drugs that may decrease INR: None  Other current anticoagulants/ drugs that may increase bleeding risk: None  Risk factors: None  Daily INR ordered: Yes    Recent Labs     09/20/24  2305 09/22/24  1654   HGB 14.0 12.8    245     Recent Labs     09/20/24  2305   ALT 29   AST 25     Recent Labs     09/20/24  2305 09/22/24  1654   INR 2.0* 2.0*       Date INR Previous Dose   9/20          2 PTA regimen   9/22          2 5 mg     Assessment/ Plan:  CBC unremarkable  INR remains therapeutic on low end of range, will continue home dose  Will order warfarin 5 mg PO x 1 dose.  Daily INR ordered through 9/21  Pharmacy will continue to monitor daily and adjust therapy as indicated.        Well controlled continue home metoprolol, lasix, and sprinolactone

## 2024-09-22 NOTE — PROGRESS NOTES
Pt   remains  alert & oriented  x4   with iv  hydration  continues along with   iv antibiotic.Pt   has been having low grade  temperature thru the  shift.Hospitalist on  call Dr Garcia  was made  aware of   the  first  temp of 100.9, via  message, no   new  orders.Dr Garcia  was  also  made  aware  that  Pt  has  no  Tylenol  order  on  his  profile.No  orders  were  entered.Above  was  endorsed  to on coming  morning  nurse..

## 2024-09-23 PROBLEM — R78.81 GRAM-NEGATIVE BACTEREMIA: Status: ACTIVE | Noted: 2024-09-23

## 2024-09-23 PROBLEM — N17.9 AKI (ACUTE KIDNEY INJURY) (HCC): Status: ACTIVE | Noted: 2024-09-23

## 2024-09-23 PROBLEM — N41.0 PROSTATITIS, ACUTE: Status: ACTIVE | Noted: 2024-09-23

## 2024-09-23 PROBLEM — N41.9 PROSTATITIS: Status: ACTIVE | Noted: 2024-09-23

## 2024-09-23 PROBLEM — N39.0 COMPLICATED UTI (URINARY TRACT INFECTION): Status: ACTIVE | Noted: 2024-09-23

## 2024-09-23 LAB
ANION GAP SERPL CALC-SCNC: 9 MMOL/L (ref 2–12)
BACTERIA SPEC CULT: ABNORMAL
BACTERIA SPEC CULT: NORMAL
BACTERIA SPEC CULT: NORMAL
BASOPHILS # BLD: 0 K/UL (ref 0–0.1)
BASOPHILS NFR BLD: 0 % (ref 0–1)
BUN SERPL-MCNC: 9 MG/DL (ref 6–20)
BUN/CREAT SERPL: 9 (ref 12–20)
CA-I BLD-MCNC: 7.6 MG/DL (ref 8.5–10.1)
CHLORIDE SERPL-SCNC: 111 MMOL/L (ref 97–108)
CO2 SERPL-SCNC: 18 MMOL/L (ref 21–32)
CREAT SERPL-MCNC: 0.97 MG/DL (ref 0.7–1.3)
CRP SERPL-MCNC: 23.7 MG/DL (ref 0–0.3)
DIFFERENTIAL METHOD BLD: ABNORMAL
EOSINOPHIL # BLD: 0.1 K/UL (ref 0–0.4)
EOSINOPHIL NFR BLD: 1 % (ref 0–7)
ERYTHROCYTE [DISTWIDTH] IN BLOOD BY AUTOMATED COUNT: 14.2 % (ref 11.5–14.5)
GLUCOSE SERPL-MCNC: 105 MG/DL (ref 65–100)
HCT VFR BLD AUTO: 36.6 % (ref 36.6–50.3)
HGB BLD-MCNC: 12.4 G/DL (ref 12.1–17)
IMM GRANULOCYTES # BLD AUTO: 0.2 K/UL (ref 0–0.04)
IMM GRANULOCYTES NFR BLD AUTO: 1 % (ref 0–0.5)
INR PPP: 2.1 (ref 0.9–1.1)
LYMPHOCYTES # BLD: 2 K/UL (ref 0.8–3.5)
LYMPHOCYTES NFR BLD: 14 % (ref 12–49)
Lab: ABNORMAL
Lab: NORMAL
MCH RBC QN AUTO: 28.8 PG (ref 26–34)
MCHC RBC AUTO-ENTMCNC: 33.9 G/DL (ref 30–36.5)
MCV RBC AUTO: 84.9 FL (ref 80–99)
MONOCYTES # BLD: 1 K/UL (ref 0–1)
MONOCYTES NFR BLD: 7 % (ref 5–13)
NEUTS SEG # BLD: 11.7 K/UL (ref 1.8–8)
NEUTS SEG NFR BLD: 77 % (ref 32–75)
NRBC # BLD: 0 K/UL (ref 0–0.01)
NRBC BLD-RTO: 0 PER 100 WBC
PLATELET # BLD AUTO: 271 K/UL (ref 150–400)
PMV BLD AUTO: 11.1 FL (ref 8.9–12.9)
POTASSIUM SERPL-SCNC: 3.6 MMOL/L (ref 3.5–5.1)
PROCALCITONIN SERPL-MCNC: 52.83 NG/ML
PROTHROMBIN TIME: 24 SEC (ref 11.9–14.6)
RBC # BLD AUTO: 4.31 M/UL (ref 4.1–5.7)
SODIUM SERPL-SCNC: 138 MMOL/L (ref 136–145)
WBC # BLD AUTO: 15.1 K/UL (ref 4.1–11.1)

## 2024-09-23 PROCEDURE — 84145 PROCALCITONIN (PCT): CPT

## 2024-09-23 PROCEDURE — 36415 COLL VENOUS BLD VENIPUNCTURE: CPT

## 2024-09-23 PROCEDURE — 1100000000 HC RM PRIVATE

## 2024-09-23 PROCEDURE — 85610 PROTHROMBIN TIME: CPT

## 2024-09-23 PROCEDURE — 86140 C-REACTIVE PROTEIN: CPT

## 2024-09-23 PROCEDURE — 85025 COMPLETE CBC W/AUTO DIFF WBC: CPT

## 2024-09-23 PROCEDURE — 2580000003 HC RX 258: Performed by: INTERNAL MEDICINE

## 2024-09-23 PROCEDURE — 6370000000 HC RX 637 (ALT 250 FOR IP): Performed by: INTERNAL MEDICINE

## 2024-09-23 PROCEDURE — 80048 BASIC METABOLIC PNL TOTAL CA: CPT

## 2024-09-23 PROCEDURE — 6360000002 HC RX W HCPCS: Performed by: INTERNAL MEDICINE

## 2024-09-23 PROCEDURE — 99222 1ST HOSP IP/OBS MODERATE 55: CPT | Performed by: UROLOGY

## 2024-09-23 PROCEDURE — 99232 SBSQ HOSP IP/OBS MODERATE 35: CPT | Performed by: INTERNAL MEDICINE

## 2024-09-23 RX ORDER — AMLODIPINE BESYLATE 5 MG/1
5 TABLET ORAL NIGHTLY
Status: DISCONTINUED | OUTPATIENT
Start: 2024-09-23 | End: 2024-09-26 | Stop reason: HOSPADM

## 2024-09-23 RX ORDER — WARFARIN SODIUM 5 MG/1
5 TABLET ORAL
Status: COMPLETED | OUTPATIENT
Start: 2024-09-23 | End: 2024-09-23

## 2024-09-23 RX ADMIN — MEROPENEM 1000 MG: 1 INJECTION INTRAVENOUS at 13:36

## 2024-09-23 RX ADMIN — ACETAMINOPHEN 650 MG: 325 TABLET ORAL at 16:13

## 2024-09-23 RX ADMIN — SODIUM CHLORIDE: 9 INJECTION, SOLUTION INTRAVENOUS at 06:48

## 2024-09-23 RX ADMIN — SOTALOL HYDROCHLORIDE 40 MG: 80 TABLET ORAL at 10:17

## 2024-09-23 RX ADMIN — AMLODIPINE BESYLATE 5 MG: 5 TABLET ORAL at 22:31

## 2024-09-23 RX ADMIN — SOTALOL HYDROCHLORIDE 40 MG: 80 TABLET ORAL at 22:31

## 2024-09-23 RX ADMIN — MEROPENEM 1000 MG: 1 INJECTION INTRAVENOUS at 22:30

## 2024-09-23 RX ADMIN — ACETAMINOPHEN 650 MG: 325 TABLET ORAL at 03:58

## 2024-09-23 RX ADMIN — TAMSULOSIN HYDROCHLORIDE 0.4 MG: 0.4 CAPSULE ORAL at 10:17

## 2024-09-23 RX ADMIN — SODIUM CHLORIDE: 9 INJECTION, SOLUTION INTRAVENOUS at 22:29

## 2024-09-23 RX ADMIN — MEROPENEM 1000 MG: 1 INJECTION INTRAVENOUS at 04:00

## 2024-09-23 RX ADMIN — WARFARIN SODIUM 5 MG: 5 TABLET ORAL at 17:40

## 2024-09-23 ASSESSMENT — PAIN DESCRIPTION - LOCATION
LOCATION: HEAD

## 2024-09-23 ASSESSMENT — PAIN SCALES - WONG BAKER: WONGBAKER_NUMERICALRESPONSE: NO HURT

## 2024-09-23 ASSESSMENT — PAIN SCALES - GENERAL
PAINLEVEL_OUTOF10: 4
PAINLEVEL_OUTOF10: 0
PAINLEVEL_OUTOF10: 4
PAINLEVEL_OUTOF10: 4

## 2024-09-23 ASSESSMENT — PAIN DESCRIPTION - DESCRIPTORS: DESCRIPTORS: ACHING;DULL

## 2024-09-23 ASSESSMENT — PAIN DESCRIPTION - PAIN TYPE: TYPE: NEUROPATHIC PAIN

## 2024-09-23 ASSESSMENT — PAIN - FUNCTIONAL ASSESSMENT: PAIN_FUNCTIONAL_ASSESSMENT: ACTIVITIES ARE NOT PREVENTED

## 2024-09-23 NOTE — PLAN OF CARE
Problem: Discharge Planning  Goal: Discharge to home or other facility with appropriate resources  Outcome: Progressing     Problem: Safety - Adult  Goal: Free from fall injury  Outcome: Progressing     Problem: Infection - Adult  Goal: Absence of infection at discharge  Outcome: Progressing  Flowsheets (Taken 9/22/2024 2100)  Absence of infection at discharge:   Monitor lab/diagnostic results   Monitor all insertion sites i.e., indwelling lines, tubes and drains   Administer medications as ordered   Instruct and encourage patient and family to use good hand hygiene technique   Assess and monitor for signs and symptoms of infection  Goal: Absence of fever/infection during anticipated neutropenic period  Outcome: Progressing     Problem: Pain  Goal: Verbalizes/displays adequate comfort level or baseline comfort level  Outcome: Progressing     Problem: Infection - Adult  Goal: Absence of infection during hospitalization  Outcome: Not Progressing  Flowsheets (Taken 9/23/2024 0639)  Absence of infection during hospitalization:   Assess and monitor for signs and symptoms of infection   Monitor lab/diagnostic results   Monitor all insertion sites i.e., indwelling lines, tubes and drains   Administer medications as ordered   Instruct and encourage patient and family to use good hand hygiene technique   Identify and instruct in appropriate isolation precautions for identified infection/condition  Note: Pt  is  on  Isolation  for  blood  /  urine infection, New  antibiotic   started

## 2024-09-23 NOTE — PROGRESS NOTES
Hospitalist Progress Note  [facility]    Subjective:   Daily Progress Note: 9/23/2024 9:08 AM    Patient feels better today family member by the bedside  Started on IV Merrem inflammatory markers are elevated  Has a history of aortic valve replacement on Coumadin  ESBL bacteremia  Prostatitis possible recent prostate biopsy  Obtained consult urology  Appreciate infectious disease contribution  Discussed in detail with patient    Current Facility-Administered Medications   Medication Dose Route Frequency    meropenem (MERREM) 1,000 mg in sodium chloride 0.9 % 100 mL IVPB (mini-bag)  1,000 mg IntraVENous Q8H    acetaminophen (TYLENOL) tablet 650 mg  650 mg Oral Q4H PRN    sotalol (BETAPACE) tablet 40 mg  40 mg Oral BID    0.9 % sodium chloride infusion   IntraVENous Continuous    tamsulosin (FLOMAX) capsule 0.4 mg  0.4 mg Oral Daily    warfarin placeholder: dosing by pharmacy   Other RX Placeholder        Review of Systems  Constitutional: negative for fevers, chills, sweats, and fatigue  Eyes: negative for redness and icterus  Ears, nose, mouth, throat, and face: negative for ear drainage, nasal congestion, sore throat, and voice change  Respiratory: negative for cough, wheezing, or dyspnea on exertion  Cardiovascular: negative for chest pain, dyspnea, palpitations, lower extremity edema  Gastrointestinal: negative for nausea, vomiting, diarrhea, and abdominal pain  Genitourinary:negative for frequency, dysuria, and hematuria  Integument/breast: negative for skin lesion(s) and dryness  Hematologic/lymphatic: negative for easy bruising, bleeding, and lymphadenopathy  Musculoskeletal:negative for neck pain, back pain, and muscle weakness  Neurological: negative for headaches, dizziness, and weakness  Behavioral/Psych: negative for anxiety and depression  Allergic/Immunologic: negative for urticaria and angioedema        Objective:     /89   Pulse 77   Temp 100.4 °F (38 °C) (Oral)   Resp

## 2024-09-23 NOTE — PROGRESS NOTES
Infectious Disease Progress Note           Subjective:   Stable, ongoing episodes of low grade fevers, Tmax of 100.4F, leukocytosis and inflammatory markers are trending down on serial labs. Reports intermittent frontal headache   Objective:   Physical Exam:     BP (!) 148/89   Pulse 79   Temp 99.1 °F (37.3 °C) (Oral)   Resp 18   Ht 1.778 m (5' 10\")   Wt 90.7 kg (200 lb)   SpO2 99%   BMI 28.70 kg/m²    O2 Device: None (Room air)    Temp (24hrs), Av.7 °F (37.6 °C), Min:98.6 °F (37 °C), Max:100.4 °F (38 °C)    701 - 1900  In: 480 [P.O.:480]  Out: 200 [Urine:200]   1901 -  0700  In: 6850.4 [P.O.:2980; I.V.:3576.3]  Out: 2650 [Urine:2650]    General: NAD, alert and following commands   HEENT: CHEPE, Moist mucosa   Lungs: CTA b/l, decreased at the bases   Heart: S1S2+, RRR, no murmur  Abdo: Soft, NT, ND, +BS   : no waddell cath   Exts: No edema, + pulses b/l   Skin: No wounds, No rashes or lesions    Data Review:       Recent Days:    Recent Labs     24  2305 24  1654 24  0543   WBC 5.3 16.0* 15.1*   HGB 14.0 12.8 12.4   HCT 42.4 38.6 36.6    245 271     Recent Labs     24  2305 24  2321 24  1654 24  0543   BUN 20  --  12 9   CREATININE 2.17* 1.67* 1.13 0.97       Lab Results   Component Value Date/Time    CRP 23.70 2024 05:43 AM          Microbiology     Results       Procedure Component Value Units Date/Time    Culture, Blood 1 [[id number]] Collected: 24 1538    Order Status: Sent Specimen: Blood Updated: 24 154    Culture, Urine [[id number]] Collected: 2430    Order Status: Canceled Specimen: Urine     Culture, Urine [[id number]]  (Abnormal) Collected: 24 0243    Order Status: Completed Specimen: Urine Updated: 24 1212     Special Requests --        No Special Requests  Reflexed from [id number]       Glady count 1,000        Glady count colonies/ml        Culture Gram Negative

## 2024-09-23 NOTE — CONSULTS
UROLOGY CONSULT    Bg Heredia MD  [phone] Office    Patient: Manuel Combs Jr. MRN: [id number]  SSN: [id number]    YOB: 1960  Age: 63 y.o.  Sex: male          Date of Encounter:  September 23, 2024  ADMITTED: 9/20/2024  for Hypokalemia [E87.6]  Acute pyelonephritis [N10]  Severe sepsis (HCC) [A41.9, R65.20]  Chief Complaint:  sepsis   Reason for consult: prostatitis             History of Present Illness:  Patient is a 63 y.o. male admitted 9/20/2024 to the hospital for Hypokalemia [E87.6]  Acute pyelonephritis [N10]  Severe sepsis (HCC) [A41.9, R65.20].     The patient presented on 9/21/2024 with generalized bodyaches and pains.  He he had a fever and was hypotensive.  He was found to be bacteremic with ESBL E. coli for which ID was consulted.  Urine culture with trace gram-negative rods and blood cultures x 2 with E. coli 9/20/2024.  The E. coli was sensitive to amikacin, ampicillin sulbactam, meropenem, Zosyn and Bactrim.  He is currently on meropenem.    Patient had [facility] under [facility]ation on 9/13/2024.  He did not take antibiotics around that time.  He suddenly started having more urgent urination 5-6 days ago with fever about 5 days ago.  He has good flow.  He was seen at urgent care 4 days ago, prior to the hospitalization.  He was given  Cipro  without improvement.    Lactate was 3.2 and Pro-Gulshan was 61.    CT of the abdomen pelvis on 9/20/2024 revealed no upper tract concerns.  There were no stones or hydronephrosis.  The bladder had circumferential thickening with surrounding stranding.  Past Medical History:  Allergies   Allergen Reactions    Lidocaine          PMHx:  has a past medical history of Hypertension.   PSurgHx:  has a past surgical history that includes Chest surgery and Aortic valve replacement.  PSocHx:  reports that he has never smoked. He has never used smokeless tobacco. He reports that he does not currently use alcohol.  09/23/2024    MCV 84.9 09/23/2024     09/23/2024     Lab Results   Component Value Date/Time    APPEARANCE Turbid 09/21/2024 02:43 AM    COLORU Yellow/Straw 09/21/2024 02:43 AM    NITRU Negative 09/21/2024 02:43 AM    GLUCOSEU Negative 09/21/2024 02:43 AM    KETUA Negative 09/21/2024 02:43 AM    UROBILINOGEN 0.1 09/21/2024 02:43 AM    BILIRUBINUR Negative 09/21/2024 02:43 AM             Assessment/Plan:  Cystitis/prostatitis status post a prostate biopsy at the [facility] on 9/13/2024.    E. coli bacteremia and sepsis with ESBL.  On meropenem.  I would recommend extended treatment, 2 weeks or greater.    Urgency expected with cystitis and prostatitis.  Observe for urinary difficulty.  We could do a bladder scan if changing irritative or obstructive symptoms.         Signed By: Bg Heredia MD  - September 23, 2024    Please note that portions of this note was potentially completed with Dragon Movie Mouthation, the computer voice recognition software.  Quite often unanticipated grammatical, syntax, homophones, and other interpretive errors are inadvertently transcribed by the computer software.  Please disregard these errors.  Please excuse any errors that have escaped final proofreading.  Thank you.

## 2024-09-23 NOTE — PROGRESS NOTES
Warfarin Dosing Consult  Manuel Combs Jr. is a 63 y.o. male with AVR. Pharmacy was consulted by Dr. Medeiros to dose and monitor warfarin.    INR Goal: 2-3    PTA Dose:   5 mg Fri, Sat, Sun, Mon  7mg Tues, Wed, Thurs    Drugs that may increase INR:None  Drugs that may decrease INR: None  Other current anticoagulants/ drugs that may increase bleeding risk: None  Risk factors: None  Daily INR ordered: Yes    Recent Labs     09/20/24  2305 09/22/24  1654 09/23/24  0543   HGB 14.0 12.8 12.4    245 271     Recent Labs     09/20/24  2305   ALT 29   AST 25     Recent Labs     09/20/24  2305 09/22/24  1654 09/23/24  0543   INR 2.0* 2.0* 2.1*       Date INR Previous Dose   9/20          2 PTA regimen   9/22          2 5 mg   9/23 2.1 5mg          Assessment/ Plan:    INR remains therapeutic on low end of range, will continue home dose  Will order warfarin 5 mg PO x 1 dose.  Daily INR ordered through 9/28  Pharmacy will continue to monitor daily and adjust therapy as indicated.

## 2024-09-24 LAB
ANION GAP SERPL CALC-SCNC: 8 MMOL/L (ref 2–12)
BASOPHILS # BLD: 0.1 K/UL (ref 0–0.1)
BASOPHILS NFR BLD: 0 % (ref 0–1)
BUN SERPL-MCNC: 9 MG/DL (ref 6–20)
BUN/CREAT SERPL: 9 (ref 12–20)
CA-I BLD-MCNC: 8.8 MG/DL (ref 8.5–10.1)
CHLORIDE SERPL-SCNC: 108 MMOL/L (ref 97–108)
CO2 SERPL-SCNC: 20 MMOL/L (ref 21–32)
CREAT SERPL-MCNC: 0.96 MG/DL (ref 0.7–1.3)
DIFFERENTIAL METHOD BLD: ABNORMAL
EOSINOPHIL # BLD: 0.1 K/UL (ref 0–0.4)
EOSINOPHIL NFR BLD: 1 % (ref 0–7)
ERYTHROCYTE [DISTWIDTH] IN BLOOD BY AUTOMATED COUNT: 14.6 % (ref 11.5–14.5)
GLUCOSE SERPL-MCNC: 103 MG/DL (ref 65–100)
HCT VFR BLD AUTO: 41.7 % (ref 36.6–50.3)
HGB BLD-MCNC: 13.8 G/DL (ref 12.1–17)
IMM GRANULOCYTES # BLD AUTO: 0.3 K/UL (ref 0–0.04)
IMM GRANULOCYTES NFR BLD AUTO: 2 % (ref 0–0.5)
INR PPP: 2.3 (ref 0.9–1.1)
LYMPHOCYTES # BLD: 2.1 K/UL (ref 0.8–3.5)
LYMPHOCYTES NFR BLD: 15 % (ref 12–49)
MCH RBC QN AUTO: 28.8 PG (ref 26–34)
MCHC RBC AUTO-ENTMCNC: 33.1 G/DL (ref 30–36.5)
MCV RBC AUTO: 86.9 FL (ref 80–99)
MONOCYTES # BLD: 0.8 K/UL (ref 0–1)
MONOCYTES NFR BLD: 6 % (ref 5–13)
NEUTS SEG # BLD: 10.2 K/UL (ref 1.8–8)
NEUTS SEG NFR BLD: 76 % (ref 32–75)
NRBC # BLD: 0 K/UL (ref 0–0.01)
NRBC BLD-RTO: 0 PER 100 WBC
PLATELET # BLD AUTO: 360 K/UL (ref 150–400)
PMV BLD AUTO: 10 FL (ref 8.9–12.9)
POTASSIUM SERPL-SCNC: 3.4 MMOL/L (ref 3.5–5.1)
PROTHROMBIN TIME: 25.6 SEC (ref 11.9–14.6)
PSA FREE MFR SERPL: 9.4 %
PSA FREE SERPL-MCNC: 1.54 NG/ML
PSA SERPL-MCNC: 16.3 NG/ML (ref 0–4)
RBC # BLD AUTO: 4.8 M/UL (ref 4.1–5.7)
SODIUM SERPL-SCNC: 136 MMOL/L (ref 136–145)
WBC # BLD AUTO: 13.5 K/UL (ref 4.1–11.1)

## 2024-09-24 PROCEDURE — 2580000003 HC RX 258: Performed by: INTERNAL MEDICINE

## 2024-09-24 PROCEDURE — 85610 PROTHROMBIN TIME: CPT

## 2024-09-24 PROCEDURE — 87040 BLOOD CULTURE FOR BACTERIA: CPT

## 2024-09-24 PROCEDURE — 99232 SBSQ HOSP IP/OBS MODERATE 35: CPT | Performed by: INTERNAL MEDICINE

## 2024-09-24 PROCEDURE — 1100000000 HC RM PRIVATE

## 2024-09-24 PROCEDURE — 6370000000 HC RX 637 (ALT 250 FOR IP): Performed by: INTERNAL MEDICINE

## 2024-09-24 PROCEDURE — 80048 BASIC METABOLIC PNL TOTAL CA: CPT

## 2024-09-24 PROCEDURE — 85025 COMPLETE CBC W/AUTO DIFF WBC: CPT

## 2024-09-24 PROCEDURE — 36415 COLL VENOUS BLD VENIPUNCTURE: CPT

## 2024-09-24 PROCEDURE — 6360000002 HC RX W HCPCS: Performed by: INTERNAL MEDICINE

## 2024-09-24 RX ORDER — WARFARIN SODIUM 5 MG/1
5 TABLET ORAL
Status: COMPLETED | OUTPATIENT
Start: 2024-09-24 | End: 2024-09-24

## 2024-09-24 RX ADMIN — SOTALOL HYDROCHLORIDE 40 MG: 80 TABLET ORAL at 08:32

## 2024-09-24 RX ADMIN — TAMSULOSIN HYDROCHLORIDE 0.4 MG: 0.4 CAPSULE ORAL at 08:32

## 2024-09-24 RX ADMIN — SOTALOL HYDROCHLORIDE 40 MG: 80 TABLET ORAL at 21:31

## 2024-09-24 RX ADMIN — WARFARIN SODIUM 5 MG: 5 TABLET ORAL at 17:58

## 2024-09-24 RX ADMIN — ACETAMINOPHEN 650 MG: 325 TABLET ORAL at 05:38

## 2024-09-24 RX ADMIN — MEROPENEM 1000 MG: 1 INJECTION INTRAVENOUS at 05:44

## 2024-09-24 RX ADMIN — MEROPENEM 1000 MG: 1 INJECTION INTRAVENOUS at 14:12

## 2024-09-24 RX ADMIN — AMLODIPINE BESYLATE 5 MG: 5 TABLET ORAL at 21:31

## 2024-09-24 RX ADMIN — MEROPENEM 1000 MG: 1 INJECTION INTRAVENOUS at 21:34

## 2024-09-24 RX ADMIN — SODIUM CHLORIDE: 9 INJECTION, SOLUTION INTRAVENOUS at 17:56

## 2024-09-24 ASSESSMENT — PAIN SCALES - WONG BAKER: WONGBAKER_NUMERICALRESPONSE: NO HURT

## 2024-09-24 ASSESSMENT — PAIN SCALES - GENERAL: PAINLEVEL_OUTOF10: 0

## 2024-09-24 ASSESSMENT — PAIN DESCRIPTION - LOCATION: LOCATION: HEAD

## 2024-09-24 NOTE — PLAN OF CARE
Problem: Discharge Planning  Goal: Discharge to home or other facility with appropriate resources  Outcome: Progressing     Problem: Safety - Adult  Goal: Free from fall injury  Outcome: Progressing     Problem: Infection - Adult  Goal: Absence of infection at discharge  Outcome: Progressing  Goal: Absence of infection during hospitalization  Outcome: Progressing  Goal: Absence of fever/infection during anticipated neutropenic period  Outcome: Progressing     Problem: Pain  Goal: Verbalizes/displays adequate comfort level or baseline comfort level  Outcome: Progressing

## 2024-09-24 NOTE — CARE COORDINATION
Met with patient at bedside to discuss discharge pan, need for home health and infusion service. [facility] of choice for home health and infusion service, referral sent via [employer] Prescription uploaded.     Will need accepting home health, IV abx teaching and picc line placement fir discharge.

## 2024-09-24 NOTE — PROGRESS NOTES
Infectious Disease Progress Note           Subjective:   Pt seen and examined at bedside. Stable, denies new complaints, decreased febrile episodes, WBC trending down on serial labs   Objective:   Physical Exam:     BP (!) 130/91   Pulse 73   Temp 97.7 °F (36.5 °C) (Oral)   Resp 18   Ht 1.778 m (5' 10\")   Wt 90.7 kg (200 lb)   SpO2 99%   BMI 28.70 kg/m²    O2 Device: None (Room air)    Temp (24hrs), Av.5 °F (36.9 °C), Min:97.7 °F (36.5 °C), Max:99.1 °F (37.3 °C)    701 - 1900  In: 240 [P.O.:240]  Out: -    1901 -  07  In: 4290.8 [P.O.:1720; I.V.:2376.3]  Out:  [Urine:]    General: NAD, AAO x 4  HEENT: CHEPE, Moist mucosa   Lungs: CTA b/l, decreased at the bases   Heart: S1S2+, RRR, no murmur  Abdo: Soft, NT, ND, +BS   : no waddell cath   Exts: No edema, + pulses b/l   Skin: No wounds, No rashes or lesions    Data Review:       Recent Days:    Recent Labs     24  1654 24  0543 24  0938   WBC 16.0* 15.1* 13.5*   HGB 12.8 12.4 13.8   HCT 38.6 36.6 41.7    271 360     Recent Labs     24  1654 24  0543 24  0632   BUN 12 9 9   CREATININE 1.13 0.97 0.96       Lab Results   Component Value Date/Time    CRP 23.70 2024 05:43 AM          Microbiology     Results       Procedure Component Value Units Date/Time    Culture, Blood 2 [[id number]] Collected: 24 0938    Order Status: Sent Specimen: Blood Updated: 2449    Culture, Blood 1 [[id number]] Collected: 24 1538    Order Status: Completed Specimen: Blood Updated: 24 0911     Special Requests --        No Special Requests  Right  Forearm       Culture No growth 2 days       Culture, Urine [[id number]] Collected: 24 0930    Order Status: Canceled Specimen: Urine     Culture, Urine [[id number]]  (Abnormal) Collected: 24 0243    Order Status: Completed Specimen: Urine Updated: 24 1212     Special Requests --        No

## 2024-09-24 NOTE — PROGRESS NOTES
Received care of this patient via bedside shift report. Patient adlib to bathroom. Wife at bedside. No events reported from off going nurse.

## 2024-09-24 NOTE — PROGRESS NOTES
Warfarin Dosing Consult  Manuel Combs Jr. is a 63 y.o. male with AVR. Pharmacy was consulted by Dr. Medeiros to dose and monitor warfarin.    INR Goal: 2-3    PTA Dose:   5 mg Fri, Sat, Sun, Mon  7mg Tues, Wed, Thurs    Drugs that may increase INR:None  Drugs that may decrease INR: None  Other current anticoagulants/ drugs that may increase bleeding risk: None  Risk factors: None  Daily INR ordered: Yes    Recent Labs     09/22/24  1654 09/23/24  0543 09/24/24  0938   HGB 12.8 12.4 13.8    271 360     No results for input(s): \"ALT\", \"AST\" in the last 72 hours.    Recent Labs     09/22/24  1654 09/23/24  0543 09/24/24  0632   INR 2.0* 2.1* 2.3*       Date INR Previous Dose   9/20          2 PTA regimen   9/22          2 5 mg   9/23 2.1 5 mg   9/24 2.3 5 mg     Assessment/ Plan:  H/H and platelets stable.  INR remains therapeutic. Will order Warfarin 5 mg x 1 dose tonight.  Daily INR ordered through 9/28  Pharmacy will continue to monitor daily and adjust therapy as indicated.

## 2024-09-24 NOTE — PROGRESS NOTES
Hospitalist Progress Note      Subjective:   Daily Progress Note: 9/24/2024 8:45 AM    Patient feels better today family member by the bedside  Started on IV Merrem inflammatory markers are elevated  Has a history of aortic valve replacement on Coumadin  ESBL bacteremia  Prostatitis possible recent prostate biopsy  Obtained consult urology  Appreciate infectious disease contribution  Discussed in detail with patient  9/24  Patient feels better  Family member by the bedside  ID notes reviewed  Urology notes reviewed  Multidrug-resistant ESBL E. coli  Repeat CBC  Repeat blood culture   Possible PICC line placement if okay with ID with negative blood cultures  Discussed with patient and family member at bedside    Current Facility-Administered Medications   Medication Dose Route Frequency    warfarin placeholder: dosing by pharmacy   Other RX Placeholder    amLODIPine (NORVASC) tablet 5 mg  5 mg Oral Nightly    meropenem (MERREM) 1,000 mg in sodium chloride 0.9 % 100 mL IVPB (mini-bag)  1,000 mg IntraVENous Q8H    acetaminophen (TYLENOL) tablet 650 mg  650 mg Oral Q4H PRN    sotalol (BETAPACE) tablet 40 mg  40 mg Oral BID    0.9 % sodium chloride infusion   IntraVENous Continuous    tamsulosin (FLOMAX) capsule 0.4 mg  0.4 mg Oral Daily        Review of Systems  Constitutional: negative for fevers, chills, sweats, and fatigue  Eyes: negative for redness and icterus  Ears, nose, mouth, throat, and face: negative for ear drainage, nasal congestion, sore throat, and voice change  Respiratory: negative for cough, wheezing, or dyspnea on exertion  Cardiovascular: negative for chest pain, dyspnea, palpitations, lower extremity edema  Gastrointestinal: negative for nausea, vomiting, diarrhea, and abdominal pain  Genitourinary:negative for frequency, dysuria, and hematuria  Integument/breast: negative for skin lesion(s) and dryness  Hematologic/lymphatic: negative for easy bruising, bleeding,

## 2024-09-25 LAB
ALBUMIN SERPL-MCNC: 2.3 G/DL (ref 3.5–5)
ALBUMIN/GLOB SERPL: 0.5 (ref 1.1–2.2)
ALP SERPL-CCNC: 136 U/L (ref 45–117)
ALT SERPL-CCNC: 29 U/L (ref 12–78)
ANION GAP SERPL CALC-SCNC: 8 MMOL/L (ref 2–12)
AST SERPL W P-5'-P-CCNC: 27 U/L (ref 15–37)
BASOPHILS # BLD: 0 K/UL (ref 0–0.1)
BASOPHILS NFR BLD: 0 % (ref 0–1)
BILIRUB DIRECT SERPL-MCNC: 0.2 MG/DL (ref 0–0.2)
BILIRUB SERPL-MCNC: 0.6 MG/DL (ref 0.2–1)
BUN SERPL-MCNC: 11 MG/DL (ref 6–20)
BUN/CREAT SERPL: 11 (ref 12–20)
CA-I BLD-MCNC: 8.6 MG/DL (ref 8.5–10.1)
CHLORIDE SERPL-SCNC: 108 MMOL/L (ref 97–108)
CO2 SERPL-SCNC: 22 MMOL/L (ref 21–32)
CREAT SERPL-MCNC: 1.03 MG/DL (ref 0.7–1.3)
CRP SERPL-MCNC: 12.5 MG/DL (ref 0–0.3)
DIFFERENTIAL METHOD BLD: ABNORMAL
EOSINOPHIL # BLD: 0.1 K/UL (ref 0–0.4)
EOSINOPHIL NFR BLD: 1 % (ref 0–7)
ERYTHROCYTE [DISTWIDTH] IN BLOOD BY AUTOMATED COUNT: 14.6 % (ref 11.5–14.5)
GLOBULIN SER CALC-MCNC: 4.4 G/DL (ref 2–4)
GLUCOSE SERPL-MCNC: 98 MG/DL (ref 65–100)
HCT VFR BLD AUTO: 37.6 % (ref 36.6–50.3)
HGB BLD-MCNC: 12.6 G/DL (ref 12.1–17)
IMM GRANULOCYTES # BLD AUTO: 0 K/UL
IMM GRANULOCYTES NFR BLD AUTO: 0 %
INR PPP: 2.2 (ref 0.9–1.1)
LYMPHOCYTES # BLD: 1.8 K/UL (ref 0.8–3.5)
LYMPHOCYTES NFR BLD: 15 % (ref 12–49)
MCH RBC QN AUTO: 28.7 PG (ref 26–34)
MCHC RBC AUTO-ENTMCNC: 33.5 G/DL (ref 30–36.5)
MCV RBC AUTO: 85.6 FL (ref 80–99)
METAMYELOCYTES NFR BLD MANUAL: 1 %
MONOCYTES # BLD: 1 K/UL (ref 0–1)
MONOCYTES NFR BLD: 8 % (ref 5–13)
NEUTS SEG # BLD: 8.9 K/UL (ref 1.8–8)
NEUTS SEG NFR BLD: 75 % (ref 32–75)
NRBC # BLD: 0 K/UL (ref 0–0.01)
NRBC BLD-RTO: 0 PER 100 WBC
PLATELET # BLD AUTO: 366 K/UL (ref 150–400)
PLATELET COMMENT: ABNORMAL
PMV BLD AUTO: 10 FL (ref 8.9–12.9)
POTASSIUM SERPL-SCNC: 3.5 MMOL/L (ref 3.5–5.1)
PROCALCITONIN SERPL-MCNC: 17.71 NG/ML
PROT SERPL-MCNC: 6.7 G/DL (ref 6.4–8.2)
PROTHROMBIN TIME: 24.8 SEC (ref 11.9–14.6)
RBC # BLD AUTO: 4.39 M/UL (ref 4.1–5.7)
RBC MORPH BLD: ABNORMAL
SODIUM SERPL-SCNC: 138 MMOL/L (ref 136–145)
WBC # BLD AUTO: 11.9 K/UL (ref 4.1–11.1)
WBC MORPH BLD: ABNORMAL

## 2024-09-25 PROCEDURE — 86140 C-REACTIVE PROTEIN: CPT

## 2024-09-25 PROCEDURE — 1100000000 HC RM PRIVATE

## 2024-09-25 PROCEDURE — 84145 PROCALCITONIN (PCT): CPT

## 2024-09-25 PROCEDURE — 80076 HEPATIC FUNCTION PANEL: CPT

## 2024-09-25 PROCEDURE — 6370000000 HC RX 637 (ALT 250 FOR IP): Performed by: INTERNAL MEDICINE

## 2024-09-25 PROCEDURE — 36569 INSJ PICC 5 YR+ W/O IMAGING: CPT

## 2024-09-25 PROCEDURE — 02HV33Z INSERTION OF INFUSION DEVICE INTO SUPERIOR VENA CAVA, PERCUTANEOUS APPROACH: ICD-10-PCS | Performed by: INTERNAL MEDICINE

## 2024-09-25 PROCEDURE — 2580000003 HC RX 258: Performed by: INTERNAL MEDICINE

## 2024-09-25 PROCEDURE — 85610 PROTHROMBIN TIME: CPT

## 2024-09-25 PROCEDURE — 80048 BASIC METABOLIC PNL TOTAL CA: CPT

## 2024-09-25 PROCEDURE — 6360000002 HC RX W HCPCS: Performed by: INTERNAL MEDICINE

## 2024-09-25 PROCEDURE — 85025 COMPLETE CBC W/AUTO DIFF WBC: CPT

## 2024-09-25 PROCEDURE — 99232 SBSQ HOSP IP/OBS MODERATE 35: CPT | Performed by: INTERNAL MEDICINE

## 2024-09-25 PROCEDURE — 36415 COLL VENOUS BLD VENIPUNCTURE: CPT

## 2024-09-25 RX ORDER — ERTAPENEM 1 G/1
1000 INJECTION, POWDER, LYOPHILIZED, FOR SOLUTION INTRAMUSCULAR; INTRAVENOUS EVERY 24 HOURS
Qty: 14 EACH | Refills: 0 | Status: SHIPPED | OUTPATIENT
Start: 2024-09-26 | End: 2024-10-10

## 2024-09-25 RX ADMIN — TAMSULOSIN HYDROCHLORIDE 0.4 MG: 0.4 CAPSULE ORAL at 09:15

## 2024-09-25 RX ADMIN — MEROPENEM 1000 MG: 1 INJECTION INTRAVENOUS at 05:45

## 2024-09-25 RX ADMIN — MEROPENEM 1000 MG: 1 INJECTION INTRAVENOUS at 16:12

## 2024-09-25 RX ADMIN — AMLODIPINE BESYLATE 5 MG: 5 TABLET ORAL at 21:23

## 2024-09-25 RX ADMIN — SOTALOL HYDROCHLORIDE 40 MG: 80 TABLET ORAL at 09:15

## 2024-09-25 RX ADMIN — SOTALOL HYDROCHLORIDE 40 MG: 80 TABLET ORAL at 21:23

## 2024-09-25 RX ADMIN — WARFARIN SODIUM 7 MG: 5 TABLET ORAL at 18:38

## 2024-09-25 RX ADMIN — SODIUM CHLORIDE: 9 INJECTION, SOLUTION INTRAVENOUS at 21:26

## 2024-09-25 RX ADMIN — MEROPENEM 1000 MG: 1 INJECTION INTRAVENOUS at 21:28

## 2024-09-25 ASSESSMENT — PAIN SCALES - GENERAL
PAINLEVEL_OUTOF10: 0
PAINLEVEL_OUTOF10: 0

## 2024-09-25 NOTE — PROGRESS NOTES
Warfarin Dosing Consult  Manuel Combs Jr. is a 63 y.o. male with AVR. Pharmacy was consulted by Dr. Medeiros to dose and monitor warfarin.    INR Goal: 2-3    PTA Dose:   5 mg Fri, Sat, Sun, Mon  7mg Tues, Wed, Thurs    Drugs that may increase INR:None  Drugs that may decrease INR: None  Other current anticoagulants/ drugs that may increase bleeding risk: None  Risk factors: None  Daily INR ordered: Yes    Recent Labs     09/22/24  1654 09/23/24  0543 09/24/24  0938   HGB 12.8 12.4 13.8    271 360     No results for input(s): \"ALT\", \"AST\" in the last 72 hours.    Recent Labs     09/22/24  1654 09/23/24  0543 09/24/24  0632   INR 2.0* 2.1* 2.3*       Date INR Previous Dose   9/20          2 PTA regimen   9/22          2 5 mg   9/23 2.1 5 mg   9/24 2.3 5 mg   9/25 2.2 5 mg     Assessment/ Plan:  H/H and platelets stable.  INR remains therapeutic. Will order Warfarin 7 mg x 1 dose tonight.  Daily INR ordered through 9/28  Pharmacy will continue to monitor daily and adjust therapy as indicated.

## 2024-09-25 NOTE — PROGRESS NOTES
Requests --        No Special Requests  Reflexed from [id number]       [place] count 1,000        [place] count colonies/ml        Culture Gram Negative Rods       Blood Culture 1 [[id number]]  (Abnormal) Collected: 09/20/24 2305    Order Status: Completed Specimen: Blood Updated: 09/23/24 0625     Special Requests --        No Special Requests  Right       Culture       Escherichia coli ** (EXTENDED SPECTRUM BETA LACTAMASE ) ** growing in both bottles drawn No Site Indicated REFER TO [id number] FOR SENSITIVITIES          Blood Culture 2 [[id number]]  (Susceptibility) Collected: 09/20/24 2305    Order Status: Completed Specimen: Blood Updated: 09/23/24 1342     Special Requests No Special Requests        Culture       Escherichia coli ** (EXTENDED SPECTRUM BETA LACTAMASE ) ** growing in both bottles drawn No Site Indicated                  (NOTE) GNR CALLED TO AND READ BACK BY VENKAT OJEDA AT [facility] 9/21/24 1742. MR ESBL ECOL CALLED MR MARTINEZ MT ON 9/23/24 AT 1339.HH CALLED TO AND READ BACK BY ANN-MARIE RN AT 1340 ON 9/23/2024 JH          Susceptibility        Escherichia coli      BACTERIAL SUSCEPTIBILITY PANEL KENYA      amikacin <=2 ug/mL Sensitive      ampicillin >=32 ug/mL Resistant      ampicillin-sulbactam 16 ug/mL Intermediate      ceFAZolin >=64 ug/mL Resistant      cefepime 2 ug/mL Resistant      cefTAZidime 4 ug/mL Resistant      cefTRIAXone >=64 ug/mL Resistant      ciprofloxacin >=4 ug/mL Resistant      gentamicin >=16 ug/mL Resistant      levofloxacin >=8 ug/mL Resistant      meropenem <=0.25 ug/mL Sensitive      piperacillin-tazobactam <=4 ug/mL Sensitive      tobramycin 8 ug/mL Intermediate      trimethoprim-sulfamethoxazole <=20 ug/mL Sensitive                           COVID-19 & Influenza Combo [[id number]] Collected: 09/20/24 2305    Order Status: Completed Specimen: Nasopharyngeal Updated: 09/21/24 0012     SARS-CoV-2, PCR Not Detected        Comment: Not Detected results do not preclude      Salmonella species by PCR Not detected     Serratia marcescens by PCR Not detected     Haemophilus Influenzae by PCR Not detected     Neisseria meningitidis by PCR Not detected     Pseudomonas aeruginosa Not detected     Stenotrophomonas maltophilia by PCR Not detected     Candida albicans by PCR Not detected     Candida auris by PCR Not detected     Candida glabrata Not detected     Candida krusei by PCR Not detected     Candida parapsilosis by PCR Not detected     Candida tropicalis by PCR Not detected     Cryptococcus neoformans/gattii by PCR Not detected     Resistant gene targets          Resistant gene ctx-m by PCR Detected        Resistant gene imp by PCR Not detected     KPC (Carbapenem resistance gene) Not detected     Colistin Resistance mcr-1 gene by PCR Not detected     Resistant gene ndm by PCR Not detected     Resistant gene oxa-48-like by pcr Not detected     Resistant gene vim by PCR Not detected     [employer] test comment False positive results may rarely occur. Correlate with     Comment: clinical,epidemiologic,  and other laboratory findings  Please see BCID Interpretation Guide in EPIC Links         COVID-19 & Influenza Combo [[id number]] Collected: 09/20/24 1053    Order Status: Completed Specimen: Nasopharyngeal Updated: 09/20/24 1134     SARS-CoV-2, PCR Not Detected        Comment: Not Detected results do not preclude SARS-CoV-2 infection and should not be used as the sole basis for patient management decisions. Results must be combined with clinical observations, patient history, and epidemiological information        Rapid Influenza A By PCR Not Detected        Rapid Influenza B By PCR Not Detected        Comment: Testing was performed using raiza Nery SARS-CoV-2 and Influenza A/B nucleic acid assay. This test is a multiplex Real-Time Reverse Transcriptase Polymerase Chain Reaction (RT-PCR) based in vitro diagnostic test intended for the qualitative detection of nucleic acids from

## 2024-09-25 NOTE — PROGRESS NOTES
4 Eyes Skin Assessment     NAME:  Manuel Combs Jr.  YOB: 1960  MEDICAL RECORD NUMBER:  [id number]    The patient is being assessed for  Other weekly assessment    I agree that at least one RN has performed a thorough Head to Toe Skin Assessment on the patient. ALL assessment sites listed below have been assessed.      Areas assessed by both nurses:    Head, Face, Ears, Shoulders, Back, Chest, Arms, Elbows, Hands, Sacrum. Buttock, Coccyx, Ischium, Legs. Feet and Heels, and Under Medical Devices         Does the Patient have a Wound? No noted wound(s)       Gorge Prevention initiated by RN: Yes  Wound Care Orders initiated by RN: No    Pressure Injury (Stage 3,4, Unstageable, DTI, NWPT, and Complex wounds) if present, place Wound referral order by RN under : No    New Ostomies, if present place, Ostomy referral order under : No     Nurse 1 eSignature: Electronically signed by Breanna Burris RN on 9/25/24 at 3:59 AM EDT    **SHARE this note so that the co-signing nurse can place an eSignature**    Nurse 2 eSignature: Electronically signed by Kayla Jaime RN on 9/25/24 at 4:35 AM EDT

## 2024-09-25 NOTE — PLAN OF CARE
Problem: Discharge Planning  Goal: Discharge to home or other facility with appropriate resources  9/24/2024 2256 by Kayla Jaime RN  Outcome: Progressing  9/24/2024 0951 by Camilla Medina RN  Outcome: Progressing     Problem: Safety - Adult  Goal: Free from fall injury  9/24/2024 2256 by Kayla Jaime RN  Outcome: Progressing  9/24/2024 0951 by Camilla Medina RN  Outcome: Progressing     Problem: Infection - Adult  Goal: Absence of infection at discharge  9/24/2024 2256 by Kayla Jaime RN  Outcome: Progressing  9/24/2024 0951 by Camilla Medina RN  Outcome: Progressing  Goal: Absence of infection during hospitalization  9/24/2024 2256 by Kayla Jaime RN  Outcome: Progressing  9/24/2024 0951 by Camilla Medina RN  Outcome: Progressing  Goal: Absence of fever/infection during anticipated neutropenic period  9/24/2024 2256 by Kayla Jaime RN  Outcome: Progressing  9/24/2024 0951 by Camilla Medina RN  Outcome: Progressing     Problem: Pain  Goal: Verbalizes/displays adequate comfort level or baseline comfort level  9/24/2024 2256 by Kayla Jaime RN  Outcome: Progressing  9/24/2024 0951 by Camilla Medina RN  Outcome: Progressing

## 2024-09-25 NOTE — PROCEDURES
PICC Line Insertion Procedure Note    Procedure: Insertion of #4 FR/18G PICC    Indications:  Long Term IV therapy, Home IV therapy    Procedure Details   Informed consent was obtained for the procedure, including sedation.  Risks of lung perforation, hemorrhage, and adverse drug reaction were discussed.     #4 FR/18G PICC inserted to the R Basilic vein per hospital protocol.   Blood return:  yes    Findings:  Catheter inserted to 44 cm, with 0 cm. Exposed. Mid upper arm circumference is 32 cm.  There were no changes to vital signs. Catheter was flushed with 20 cc NS. Patient did tolerate procedure well.    Recommendations:  Tip in distal SVC. Confirmed with 3CG. Okay to use.  PICC Brochure given to patient with teaching instruction.PROCEDURE NOTE  Date: 9/25/2024   Name: Manuel Combs Jr.  YOB: 1960    Procedures

## 2024-09-25 NOTE — PROGRESS NOTES
Hospitalist Progress Note  [facility]    Subjective:   Daily Progress Note: 9/25/2024 9:47 AM    Patient feels better today family member by the bedside  Started on IV Merrem inflammatory markers are elevated  Has a history of aortic valve replacement on Coumadin  ESBL bacteremia  Prostatitis possible recent prostate biopsy  Obtained consult urology  Appreciate infectious disease contribution  Discussed in detail with patient  9/24  Patient feels better  Family member by the bedside  ID notes reviewed  Urology notes reviewed  Multidrug-resistant ESBL E. coli  Repeat CBC  Repeat blood culture   Possible PICC line placement if okay with ID with negative blood cultures  Discussed with patient and family member at bedside  9/25  Patient is feeling better  WBC count is down to 11  Plan for PICC line placement  Discussed with infectious disease plan for discharge in a.m. outpatient IV antibiotics x 3 weeks case management to assist    Current Facility-Administered Medications   Medication Dose Route Frequency    warfarin placeholder: dosing by pharmacy   Other RX Placeholder    amLODIPine (NORVASC) tablet 5 mg  5 mg Oral Nightly    meropenem (MERREM) 1,000 mg in sodium chloride 0.9 % 100 mL IVPB (mini-bag)  1,000 mg IntraVENous Q8H    sotalol (BETAPACE) tablet 40 mg  40 mg Oral BID    0.9 % sodium chloride infusion   IntraVENous Continuous    tamsulosin (FLOMAX) capsule 0.4 mg  0.4 mg Oral Daily        Review of Systems  Constitutional: negative for fevers, chills, sweats, and fatigue  Eyes: negative for redness and icterus  Ears, nose, mouth, throat, and face: negative for ear drainage, nasal congestion, sore throat, and voice change  Respiratory: negative for cough, wheezing, or dyspnea on exertion  Cardiovascular: negative for chest pain, dyspnea, palpitations, lower extremity edema  Gastrointestinal: negative for nausea, vomiting, diarrhea, and abdominal pain  Genitourinary:negative for  Collection Time: 09/25/24  6:08 AM   Result Value Ref Range    CRP 12.50 (H) 0.00 - 0.30 mg/dL   Procalcitonin    Collection Time: 09/25/24  6:08 AM   Result Value Ref Range    Procalcitonin 17.71 (H) 0 ng/mL         Assessment/Plan:     Principal Problem:    Severe sepsis (HCC)  Active Problems:    Prostatitis, acute    Gram-negative bacteremia    Complicated UTI (urinary tract infection)    PAM (acute kidney injury) ([facility])    Prostatitis  Resolved Problems:    * No resolved hospital problems. *  ESBL bacteremia  Mild metabolic acidosis      Care Plan discussed with: Patient/Family    Total time spent with patient: 30 minutes.

## 2024-09-26 VITALS
SYSTOLIC BLOOD PRESSURE: 142 MMHG | BODY MASS INDEX: 28.63 KG/M2 | HEART RATE: 64 BPM | DIASTOLIC BLOOD PRESSURE: 96 MMHG | RESPIRATION RATE: 16 BRPM | HEIGHT: 70 IN | OXYGEN SATURATION: 99 % | WEIGHT: 200 LBS | TEMPERATURE: 98.2 F

## 2024-09-26 LAB
ANION GAP SERPL CALC-SCNC: 6 MMOL/L (ref 2–12)
BASOPHILS # BLD: 0 K/UL (ref 0–0.1)
BASOPHILS NFR BLD: 0 % (ref 0–1)
BUN SERPL-MCNC: 11 MG/DL (ref 6–20)
BUN/CREAT SERPL: 12 (ref 12–20)
CA-I BLD-MCNC: 8.8 MG/DL (ref 8.5–10.1)
CHLORIDE SERPL-SCNC: 109 MMOL/L (ref 97–108)
CO2 SERPL-SCNC: 22 MMOL/L (ref 21–32)
CREAT SERPL-MCNC: 0.93 MG/DL (ref 0.7–1.3)
DIFFERENTIAL METHOD BLD: ABNORMAL
EOSINOPHIL # BLD: 0.2 K/UL (ref 0–0.4)
EOSINOPHIL NFR BLD: 2 % (ref 0–7)
ERYTHROCYTE [DISTWIDTH] IN BLOOD BY AUTOMATED COUNT: 14.6 % (ref 11.5–14.5)
GLUCOSE SERPL-MCNC: 97 MG/DL (ref 65–100)
HCT VFR BLD AUTO: 38.1 % (ref 36.6–50.3)
HGB BLD-MCNC: 12.7 G/DL (ref 12.1–17)
IMM GRANULOCYTES # BLD AUTO: 0 K/UL
IMM GRANULOCYTES NFR BLD AUTO: 0 %
INR PPP: 2.1 (ref 0.9–1.1)
LYMPHOCYTES # BLD: 1.6 K/UL (ref 0.8–3.5)
LYMPHOCYTES NFR BLD: 17 % (ref 12–49)
MCH RBC QN AUTO: 28.8 PG (ref 26–34)
MCHC RBC AUTO-ENTMCNC: 33.3 G/DL (ref 30–36.5)
MCV RBC AUTO: 86.4 FL (ref 80–99)
MONOCYTES # BLD: 0.6 K/UL (ref 0–1)
MONOCYTES NFR BLD: 6 % (ref 5–13)
NEUTS BAND NFR BLD MANUAL: 2 % (ref 0–6)
NEUTS SEG # BLD: 7 K/UL (ref 1.8–8)
NEUTS SEG NFR BLD: 73 % (ref 32–75)
NRBC # BLD: 0 K/UL (ref 0–0.01)
NRBC BLD-RTO: 0 PER 100 WBC
PLATELET # BLD AUTO: 423 K/UL (ref 150–400)
PMV BLD AUTO: 10.2 FL (ref 8.9–12.9)
POTASSIUM SERPL-SCNC: 3.8 MMOL/L (ref 3.5–5.1)
PROTHROMBIN TIME: 24 SEC (ref 11.9–14.6)
RBC # BLD AUTO: 4.41 M/UL (ref 4.1–5.7)
RBC MORPH BLD: ABNORMAL
SODIUM SERPL-SCNC: 137 MMOL/L (ref 136–145)
WBC # BLD AUTO: 9.4 K/UL (ref 4.1–11.1)

## 2024-09-26 PROCEDURE — 80048 BASIC METABOLIC PNL TOTAL CA: CPT

## 2024-09-26 PROCEDURE — 2580000003 HC RX 258: Performed by: INTERNAL MEDICINE

## 2024-09-26 PROCEDURE — 85025 COMPLETE CBC W/AUTO DIFF WBC: CPT

## 2024-09-26 PROCEDURE — 6370000000 HC RX 637 (ALT 250 FOR IP): Performed by: INTERNAL MEDICINE

## 2024-09-26 PROCEDURE — 36415 COLL VENOUS BLD VENIPUNCTURE: CPT

## 2024-09-26 PROCEDURE — 6360000002 HC RX W HCPCS: Performed by: INTERNAL MEDICINE

## 2024-09-26 PROCEDURE — 85610 PROTHROMBIN TIME: CPT

## 2024-09-26 PROCEDURE — 99232 SBSQ HOSP IP/OBS MODERATE 35: CPT | Performed by: INTERNAL MEDICINE

## 2024-09-26 RX ORDER — TAMSULOSIN HYDROCHLORIDE 0.4 MG/1
0.4 CAPSULE ORAL DAILY
Qty: 30 CAPSULE | Refills: 3 | Status: SHIPPED | OUTPATIENT
Start: 2024-09-26

## 2024-09-26 RX ORDER — DIPHENHYDRAMINE HYDROCHLORIDE 50 MG/ML
25 INJECTION INTRAMUSCULAR; INTRAVENOUS ONCE
Status: COMPLETED | OUTPATIENT
Start: 2024-09-26 | End: 2024-09-26

## 2024-09-26 RX ORDER — AMLODIPINE BESYLATE 5 MG/1
5 TABLET ORAL NIGHTLY
Qty: 30 TABLET | Refills: 3 | Status: SHIPPED | OUTPATIENT
Start: 2024-09-26

## 2024-09-26 RX ADMIN — TAMSULOSIN HYDROCHLORIDE 0.4 MG: 0.4 CAPSULE ORAL at 10:52

## 2024-09-26 RX ADMIN — SOTALOL HYDROCHLORIDE 40 MG: 80 TABLET ORAL at 10:52

## 2024-09-26 RX ADMIN — MEROPENEM 1000 MG: 1 INJECTION INTRAVENOUS at 05:37

## 2024-09-26 RX ADMIN — DIPHENHYDRAMINE HYDROCHLORIDE 25 MG: 50 INJECTION INTRAMUSCULAR; INTRAVENOUS at 11:21

## 2024-09-26 NOTE — PROGRESS NOTES
Infectious Disease Progress Note           Subjective:   Remains stable, scheduled for d/c home today, no acute events since last seen.  WBC normalized on today's labs, no febrile episodes  Objective:   Physical Exam:     BP (!) 142/96   Pulse 64   Temp 98.2 °F (36.8 °C) (Oral)   Resp 16   Ht 1.778 m (5' 10\")   Wt 90.7 kg (200 lb)   SpO2 99%   BMI 28.70 kg/m²    O2 Device: None (Room air)    Temp (24hrs), Av.1 °F (36.7 °C), Min:98 °F (36.7 °C), Max:98.2 °F (36.8 °C)    701 - 1900  In: 184.4 [I.V.:84.4]  Out: -    1901 -  0700  In: 4415.2 [I.V.:3512.5]  Out: 2175 [Urine:2175]    General: NAD, AAO x 4  HEENT: CHEPE, Moist mucosa   Lungs: CTA b/l, decreased at the bases   Heart: S1S2+, RRR, no murmur  Abdo: Soft, NT, ND, +BS   : no waddell cath   Exts: No edema, + pulses b/l   Skin: No wounds, No rashes or lesions    Data Review:       Recent Days:    Recent Labs     24  0938 24  0608 24  0611   WBC 13.5* 11.9* 9.4   HGB 13.8 12.6 12.7   HCT 41.7 37.6 38.1    366 423*     Recent Labs     24  0632 24  0608 24  0611   BUN 9 11 11   CREATININE 0.96 1.03 0.93       Lab Results   Component Value Date/Time    CRP 12.50 2024 06:08 AM          Microbiology     Results       Procedure Component Value Units Date/Time    Culture, Blood 2 [[id number]] Collected: 24 0938    Order Status: Completed Specimen: Blood Updated: 24 1024     Special Requests No Special Requests        Culture No growth 2 days       Culture, Blood 1 [[id number]] Collected: 24 1538    Order Status: Completed Specimen: Blood Updated: 24 1024     Special Requests --        No Special Requests  Right  Forearm       Culture No growth 4 days       Culture, Urine [[id number]] Collected: 24 0930    Order Status: Canceled Specimen: Urine     Culture, Urine [[id number]]  (Abnormal) Collected: 24 0243    Order Status: Completed  BRAIN PERFUSION    Result Date: 9/20/2024  CTA Head: 1. No evidence of significant stenosis or aneurysm. CTA Neck: 1. No evidence of significant stenosis. 2. Partially visualized stent graft in the descending thoracic aorta. CT Brain Perfusion: 1. No acute abnormality. Electronically signed by Hesham Willis    CT ABDOMEN PELVIS WO CONTRAST Additional Contrast? None    Result Date: 9/20/2024  Imaging findings suggestive of a moderate to severe cystitis.  Incidental and/or nonemergent findings are as described in detail above. Electronically signed by HUMAIRA WHITE    CT HEAD WO CONTRAST    Result Date: 9/20/2024  No acute process or change compared the prior exam. Electronically signed by MICHEL ALVARADO    XR CHEST (2 VW)    Result Date: 9/20/2024  No acute cardiopulmonary disease. Electronically signed by Jesse Alexander MD      Assessment/Plan     GNR bacteremia, ESBL E.coli isolated from blood Cx 09/20 repeat Bcx on 09/22 is negative          Afebrile, hemodynamically stable, WBC normalized on today's labs        On day #5 of meropenem, will continue IV antibiotics through 10/10        Plan remains to d/c on ertapenem once a day dosing convenience        Routine labs in AM, will follow up in 2 wks post d/c      2. UTI, abnormal UA, ESBL E.coli isolated from urine Cx on 09/20 and 09/21      Continue GNR coverage as above    3. Prostatitis/BPH, S/p [facility]. PSA elevated at 16.3     Has a follow-up appointment with primary urologist later today (09/26)      4. H/o aortic aneurysm s/p repair, underlying prosthetic aortic valve on chronic Coumadin     5. PAM,due to above, resolved, Cr at baseline     Diego Villa MD    9/26/2024

## 2024-09-26 NOTE — PROGRESS NOTES
Warfarin Dosing Consult  Manuel Combs Jr. is a 63 y.o. male with AVR. Pharmacy was consulted by Dr. Medeiros to dose and monitor warfarin.    INR Goal: 2-3    PTA Dose:   5 mg Fri, Sat, Sun, Mon  7mg Tues, Wed, Thurs    Drugs that may increase INR:None  Drugs that may decrease INR: None  Other current anticoagulants/ drugs that may increase bleeding risk: None  Risk factors: None  Daily INR ordered: Yes    Recent Labs     09/24/24  0938 09/25/24  0608 09/26/24  0611   HGB 13.8 12.6 12.7    366 423*     Recent Labs     09/25/24  0608   ALT 29   AST 27       Recent Labs     09/24/24  0632 09/25/24  0608 09/26/24  0611   INR 2.3* 2.2* 2.1*       Date INR Previous Dose   9/20          2 PTA regimen   9/22          2 5 mg   9/23 2.1 5 mg   9/24 2.3 5 mg   9/25 2.2 5 mg   9/26 2.1 7 mg     Assessment/ Plan:  H/H and platelets stable.  INR remains therapeutic. Will order Warfarin 7 mg x 1 dose tonight.  Daily INR ordered through 9/28  Pharmacy will continue to monitor daily and adjust therapy as indicated.

## 2024-09-26 NOTE — PLAN OF CARE
Problem: Discharge Planning  Goal: Discharge to home or other facility with appropriate resources  9/26/2024 0049 by Barbie Dickey, RN  Outcome: Progressing  9/25/2024 1747 by Rose Rivera RN  Outcome: Progressing  Flowsheets (Taken 9/25/2024 0903)  Discharge to home or other facility with appropriate resources: Identify barriers to discharge with patient and caregiver     Problem: Safety - Adult  Goal: Free from fall injury  9/26/2024 0049 by Barbie Dickey, RN  Outcome: Progressing  9/25/2024 1747 by Rose Rivera RN  Outcome: Progressing     Problem: Infection - Adult  Goal: Absence of infection at discharge  Outcome: Progressing  Goal: Absence of infection during hospitalization  Outcome: Progressing  Goal: Absence of fever/infection during anticipated neutropenic period  Outcome: Progressing     Problem: Pain  Goal: Verbalizes/displays adequate comfort level or baseline comfort level  Outcome: Progressing

## 2024-09-26 NOTE — DISCHARGE SUMMARY
Physician Discharge Summary     Patient ID:  Manuel Combs Jr.  [id number]  63 y.o.  1960    Admit date: 9/20/2024    Discharge date and time: No discharge date for patient encounter.     Admitting Physician: Eliu LEVIN MD     Discharge Physician:     Admission Diagnoses: Hypokalemia [E87.6]  Acute pyelonephritis [N10]  Severe sepsis (HCC) [A41.9, R65.20]  ESBL bacteremia  Possible acute on chronic prostatitis  Prostatic aortic valve replacement    Discharge Diagnoses:       Admission Condition: fair    Discharged Condition: good    Indication for Admission: Sepsis    Hospital Course: Patient was admitted for severe sepsis, complicated UTI, BPH, history of recent prostate biopsy, atrial fibrillation, acute kidney injury, volume depletion, hypokalemia blood culture came back positive for ESBL E. coli patient had a recent prostate biopsy was seen by urology and also by infectious disease started on IV Merrem and transition to outpatient IV Invanz patient improved with treatment by blood cell count trended downwards is to receive IV Invanz for 3 weeks follow-up with primary medical physician and also with infectious disease    Consults: ID, urology    Significant Diagnostic Studies: labs:     Treatments: IV hydration and antibiotics: Meropenem    Discharge Exam:  BP (!) 142/96   Pulse 64   Temp 98.2 °F (36.8 °C) (Oral)   Resp 16   Ht 1.778 m (5' 10\")   Wt 90.7 kg (200 lb)   SpO2 99%   BMI 28.70 kg/m²     General Appearance:    Alert, cooperative, no distress, appears stated age   Head:    Normocephalic, without obvious abnormality, atraumatic   Eyes:    PERRL, conjunctiva/corneas clear, EOM's intact, fundi     benign, both eyes        Ears:    Normal TM's and external ear canals, both ears   Nose:   Nares normal, septum midline, mucosa normal, no drainage    or sinus tenderness   Throat:   Lips, mucosa, and tongue normal; teeth and gums normal   Neck:   Supple, symmetrical, trachea midline, no

## 2024-09-26 NOTE — PROGRESS NOTES
Discharge plan of care/case management plan validated with provider discharge order.    Discharge instructions discussed with the patent and family. IV Abx and PICC care teaching done. All concerns addressed at this time.    Patient wheeled downstairs to go home.

## 2024-09-26 NOTE — CARE COORDINATION
DC Plan: Home with [facility] infusion for iv abx and nursing    Transition of Care Plan:    RUR: 11%  Prior Level of Functioning: independent  Disposition: home  If SNF or IPR: Date FOC offered: N/A  Date FOC received: N/A  Accepting facility: N/A  Date authorization started with reference number: N/A  Date authorization received and expires: N/A  Follow up appointments: Yes  DME needed: None  Transportation at discharge: Family  IM/IMM Medicare/ letter given: N/A  Is patient a [place] and connected with [facility]? N/A   If yes, was  transfer form completed and [facility] notified?   Caregiver Contact: Family  Discharge Caregiver contacted prior to discharge?   Care Conference needed? No  Barriers to discharge: None

## 2024-09-26 NOTE — PLAN OF CARE
Problem: Discharge Planning  Goal: Discharge to home or other facility with appropriate resources  9/26/2024 0050 by Barbie Dickey RN  Outcome: Progressing  9/26/2024 0049 by Barbie Dickey RN  Outcome: Progressing  9/25/2024 1747 by Rose Rivera RN  Outcome: Progressing  Flowsheets (Taken 9/25/2024 0934)  Discharge to home or other facility with appropriate resources: Identify barriers to discharge with patient and caregiver     Problem: Safety - Adult  Goal: Free from fall injury  9/26/2024 0050 by Barbie Dickey RN  Outcome: Progressing  9/26/2024 0049 by Barbie Dickey RN  Outcome: Progressing  9/25/2024 1747 by Rose Rivera RN  Outcome: Progressing     Problem: Infection - Adult  Goal: Absence of infection at discharge  9/26/2024 0050 by Barbie Dickey RN  Outcome: Progressing  9/26/2024 0049 by Barbie Dickey RN  Outcome: Progressing  Goal: Absence of infection during hospitalization  9/26/2024 0050 by Barbie Dickey RN  Outcome: Progressing  9/26/2024 0049 by Barbie Dickey RN  Outcome: Progressing  Goal: Absence of fever/infection during anticipated neutropenic period  9/26/2024 0050 by Barbie Dickey RN  Outcome: Progressing  9/26/2024 0049 by Barbie Dickey RN  Outcome: Progressing     Problem: Pain  Goal: Verbalizes/displays adequate comfort level or baseline comfort level  9/26/2024 0050 by Barbie Dickey RN  Outcome: Progressing  9/26/2024 0049 by Barbie Dickey RN  Outcome: Progressing

## 2024-09-28 LAB
BACTERIA SPEC CULT: NORMAL
BACTERIA SPEC CULT: NORMAL
Lab: NORMAL
Lab: NORMAL

## 2024-09-30 ENCOUNTER — TELEPHONE (OUTPATIENT)
Age: 64
End: 2024-09-30

## 2024-09-30 LAB
BACTERIA SPEC CULT: NORMAL
Lab: NORMAL

## 2024-09-30 NOTE — TELEPHONE ENCOUNTER
[facility] is calling stating that patient has a mild rash on both forearms and was wanting to know what would be the next steps you would like to take.  Stated we could call the patient back at [phone]   Single